# Patient Record
Sex: FEMALE | Race: WHITE
[De-identification: names, ages, dates, MRNs, and addresses within clinical notes are randomized per-mention and may not be internally consistent; named-entity substitution may affect disease eponyms.]

---

## 2017-10-16 PROBLEM — Z85.3 ENCOUNTER FOR FOLLOW-UP SURVEILLANCE OF BREAST CANCER: Status: ACTIVE | Noted: 2017-10-16

## 2017-10-16 PROBLEM — Z08 ENCOUNTER FOR FOLLOW-UP SURVEILLANCE OF BREAST CANCER: Status: ACTIVE | Noted: 2017-10-16

## 2018-04-17 ENCOUNTER — NURSE TRIAGE (OUTPATIENT)
Dept: OTHER | Facility: CLINIC | Age: 53
End: 2018-04-17

## 2019-06-21 ENCOUNTER — TELEPHONE (OUTPATIENT)
Dept: OTHER | Facility: CLINIC | Age: 54
End: 2019-06-21

## 2021-07-26 ENCOUNTER — NURSE TRIAGE (OUTPATIENT)
Dept: OTHER | Facility: CLINIC | Age: 56
End: 2021-07-26

## 2021-07-26 NOTE — TELEPHONE ENCOUNTER
Reason for Disposition   Puncture wound (hole through the skin) from cat (teeth or claws)    Answer Assessment - Initial Assessment Questions  1. ANIMAL: \"What type of animal caused the bite? \" \"Is the injury from a bite or a claw? \" If the animal is a dog or a cat, ask: \"Was it a pet or a stray? \" \"Was it acting ill or behaving strangely? \"      Domestic house cat    2. LOCATION: \"Where is the bite located? \"       R hand 5th finger and L wrist    3. SIZE: \"How big is the bite? \" \"What does it look like? \"       Puncture wounds at each location and many scratches    4. ONSET: \"When did the bite happen? \" (Minutes or hours ago)       7/26/21 at about 1000    5. CIRCUMSTANCES: \"Tell me how this happened. \"       Pt was taking the cat to the vet and cat was opposed to the idea    6. TETANUS: \"When was the last tetanus booster? \"      A long time ago at least 2013 ago    7. PREGNANCY: \"Is there any chance you are pregnant? \" \"When was your last menstrual period? \"      n/a    Protocols used: ANIMAL BITE-ADULT-OH    Brief description of triage: pt reports that her cat bit, clawed, and scratched her. There are puncture wounds on the R 5th finger and L wrist. They are swelling. Triage indicates for patient to go to [facility] now. Care advice provided, patient verbalizes understanding; denies any other questions or concerns; instructed to call back for any new or worsening symptoms. Attention Provider: Thank you for allowing me to participate in the care of your patient. The patient was connected to triage in response to symptoms provided. Please do not respond through this encounter as the response is not directed to a shared pool.

## 2024-04-16 ENCOUNTER — HOSPITAL ENCOUNTER (EMERGENCY)
Facility: HOSPITAL | Age: 59
Discharge: OTHER INSTITUTION | End: 2024-04-16
Attending: EMERGENCY MEDICINE
Payer: COMMERCIAL

## 2024-04-16 ENCOUNTER — HOSPITAL ENCOUNTER (EMERGENCY)
Facility: HOSPITAL | Age: 59
Discharge: HOME | End: 2024-04-16
Payer: COMMERCIAL

## 2024-04-16 ENCOUNTER — APPOINTMENT (OUTPATIENT)
Dept: RADIOLOGY | Facility: HOSPITAL | Age: 59
End: 2024-04-16
Payer: COMMERCIAL

## 2024-04-16 VITALS
TEMPERATURE: 97.6 F | HEIGHT: 62 IN | WEIGHT: 203 LBS | HEART RATE: 85 BPM | DIASTOLIC BLOOD PRESSURE: 69 MMHG | OXYGEN SATURATION: 98 % | RESPIRATION RATE: 18 BRPM | SYSTOLIC BLOOD PRESSURE: 139 MMHG | BODY MASS INDEX: 37.36 KG/M2

## 2024-04-16 VITALS
RESPIRATION RATE: 18 BRPM | HEIGHT: 66 IN | BODY MASS INDEX: 32.78 KG/M2 | SYSTOLIC BLOOD PRESSURE: 122 MMHG | OXYGEN SATURATION: 98 % | TEMPERATURE: 98.2 F | WEIGHT: 204 LBS | DIASTOLIC BLOOD PRESSURE: 79 MMHG | HEART RATE: 83 BPM

## 2024-04-16 DIAGNOSIS — H57.04 MYDRIASIS: Primary | ICD-10-CM

## 2024-04-16 DIAGNOSIS — H57.02 ANISOCORIA: Primary | ICD-10-CM

## 2024-04-16 LAB
ALBUMIN SERPL BCP-MCNC: 4.5 G/DL (ref 3.4–5)
ALP SERPL-CCNC: 49 U/L (ref 33–136)
ALT SERPL W P-5'-P-CCNC: 23 U/L (ref 7–45)
ANION GAP SERPL CALC-SCNC: 17 MMOL/L (ref 10–20)
AST SERPL W P-5'-P-CCNC: 29 U/L (ref 9–39)
BASOPHILS # BLD AUTO: 0.02 X10*3/UL (ref 0–0.1)
BASOPHILS NFR BLD AUTO: 0.5 %
BILIRUB SERPL-MCNC: 0.5 MG/DL (ref 0–1.2)
BUN SERPL-MCNC: 15 MG/DL (ref 6–23)
CALCIUM SERPL-MCNC: 9.5 MG/DL (ref 8.6–10.3)
CHLORIDE SERPL-SCNC: 103 MMOL/L (ref 98–107)
CO2 SERPL-SCNC: 25 MMOL/L (ref 21–32)
CREAT SERPL-MCNC: 0.92 MG/DL (ref 0.5–1.05)
CRP SERPL-MCNC: 0.46 MG/DL
EGFRCR SERPLBLD CKD-EPI 2021: 71 ML/MIN/1.73M*2
EOSINOPHIL # BLD AUTO: 0.09 X10*3/UL (ref 0–0.7)
EOSINOPHIL NFR BLD AUTO: 2.3 %
ERYTHROCYTE [DISTWIDTH] IN BLOOD BY AUTOMATED COUNT: 12 % (ref 11.5–14.5)
ERYTHROCYTE [SEDIMENTATION RATE] IN BLOOD BY WESTERGREN METHOD: 12 MM/H (ref 0–30)
GLUCOSE SERPL-MCNC: 110 MG/DL (ref 74–99)
HCT VFR BLD AUTO: 35.8 % (ref 36–46)
HGB BLD-MCNC: 12.2 G/DL (ref 12–16)
IMM GRANULOCYTES # BLD AUTO: 0 X10*3/UL (ref 0–0.7)
IMM GRANULOCYTES NFR BLD AUTO: 0 % (ref 0–0.9)
LYMPHOCYTES # BLD AUTO: 1.59 X10*3/UL (ref 1.2–4.8)
LYMPHOCYTES NFR BLD AUTO: 40.6 %
MAGNESIUM SERPL-MCNC: 2.1 MG/DL (ref 1.6–2.4)
MCH RBC QN AUTO: 30.1 PG (ref 26–34)
MCHC RBC AUTO-ENTMCNC: 34.1 G/DL (ref 32–36)
MCV RBC AUTO: 88 FL (ref 80–100)
MONOCYTES # BLD AUTO: 0.31 X10*3/UL (ref 0.1–1)
MONOCYTES NFR BLD AUTO: 7.9 %
NEUTROPHILS # BLD AUTO: 1.91 X10*3/UL (ref 1.2–7.7)
NEUTROPHILS NFR BLD AUTO: 48.7 %
NRBC BLD-RTO: 0 /100 WBCS (ref 0–0)
PLATELET # BLD AUTO: 252 X10*3/UL (ref 150–450)
POTASSIUM SERPL-SCNC: 3.5 MMOL/L (ref 3.5–5.3)
PROT SERPL-MCNC: 7.4 G/DL (ref 6.4–8.2)
RBC # BLD AUTO: 4.05 X10*6/UL (ref 4–5.2)
SODIUM SERPL-SCNC: 141 MMOL/L (ref 136–145)
WBC # BLD AUTO: 3.9 X10*3/UL (ref 4.4–11.3)

## 2024-04-16 PROCEDURE — 86140 C-REACTIVE PROTEIN: CPT | Performed by: PHYSICIAN ASSISTANT

## 2024-04-16 PROCEDURE — 70496 CT ANGIOGRAPHY HEAD: CPT | Performed by: RADIOLOGY

## 2024-04-16 PROCEDURE — 99283 EMERGENCY DEPT VISIT LOW MDM: CPT

## 2024-04-16 PROCEDURE — 36415 COLL VENOUS BLD VENIPUNCTURE: CPT | Performed by: EMERGENCY MEDICINE

## 2024-04-16 PROCEDURE — 71260 CT THORAX DX C+: CPT | Mod: FOREIGN READ | Performed by: RADIOLOGY

## 2024-04-16 PROCEDURE — 83735 ASSAY OF MAGNESIUM: CPT | Performed by: EMERGENCY MEDICINE

## 2024-04-16 PROCEDURE — 99284 EMERGENCY DEPT VISIT MOD MDM: CPT

## 2024-04-16 PROCEDURE — 2550000001 HC RX 255 CONTRASTS: Performed by: EMERGENCY MEDICINE

## 2024-04-16 PROCEDURE — 85652 RBC SED RATE AUTOMATED: CPT | Performed by: PHYSICIAN ASSISTANT

## 2024-04-16 PROCEDURE — 99222 1ST HOSP IP/OBS MODERATE 55: CPT | Performed by: PSYCHIATRY & NEUROLOGY

## 2024-04-16 PROCEDURE — 85025 COMPLETE CBC W/AUTO DIFF WBC: CPT | Performed by: EMERGENCY MEDICINE

## 2024-04-16 PROCEDURE — 70498 CT ANGIOGRAPHY NECK: CPT

## 2024-04-16 PROCEDURE — 70498 CT ANGIOGRAPHY NECK: CPT | Performed by: RADIOLOGY

## 2024-04-16 PROCEDURE — 99285 EMERGENCY DEPT VISIT HI MDM: CPT | Mod: 25

## 2024-04-16 PROCEDURE — 84075 ASSAY ALKALINE PHOSPHATASE: CPT | Performed by: EMERGENCY MEDICINE

## 2024-04-16 PROCEDURE — 71260 CT THORAX DX C+: CPT

## 2024-04-16 RX ORDER — TURMERIC ROOT EXTRACT 500 MG
1 TABLET ORAL DAILY
COMMUNITY

## 2024-04-16 RX ORDER — ALPRAZOLAM 0.25 MG/1
0.25 TABLET ORAL DAILY PRN
COMMUNITY

## 2024-04-16 RX ORDER — CHOLECALCIFEROL (VITAMIN D3) 50 MCG
50 TABLET ORAL DAILY
COMMUNITY

## 2024-04-16 RX ORDER — MULTIVITAMIN/IRON/FOLIC ACID 18MG-0.4MG
1 TABLET ORAL DAILY
COMMUNITY

## 2024-04-16 RX ORDER — BUTALB/ACETAMINOPHEN/CAFFEINE 50-325-40
1 TABLET ORAL DAILY
COMMUNITY

## 2024-04-16 RX ORDER — BISMUTH SUBSALICYLATE 262 MG
1 TABLET,CHEWABLE ORAL DAILY
COMMUNITY

## 2024-04-16 RX ORDER — MONTELUKAST SODIUM 10 MG/1
10 TABLET ORAL DAILY
COMMUNITY
End: 2024-04-16

## 2024-04-16 RX ORDER — GLUCOSAMINE/CHONDR SU A SOD 750-600 MG
1 TABLET ORAL DAILY
COMMUNITY

## 2024-04-16 RX ORDER — VITAMIN E MIXED 400 UNIT
1000 CAPSULE ORAL DAILY
COMMUNITY

## 2024-04-16 RX ORDER — TETRACYCLINE HCL 500 MG
1 CAPSULE ORAL DAILY
COMMUNITY

## 2024-04-16 RX ORDER — ZOLPIDEM TARTRATE 10 MG/1
10 TABLET ORAL NIGHTLY PRN
COMMUNITY

## 2024-04-16 RX ADMIN — IOHEXOL 90 ML: 350 INJECTION, SOLUTION INTRAVENOUS at 11:11

## 2024-04-16 ASSESSMENT — LIFESTYLE VARIABLES
HAVE PEOPLE ANNOYED YOU BY CRITICIZING YOUR DRINKING: NO
EVER HAD A DRINK FIRST THING IN THE MORNING TO STEADY YOUR NERVES TO GET RID OF A HANGOVER: NO
HAVE YOU EVER FELT YOU SHOULD CUT DOWN ON YOUR DRINKING: NO
TOTAL SCORE: 0
EVER FELT BAD OR GUILTY ABOUT YOUR DRINKING: NO

## 2024-04-16 ASSESSMENT — PAIN SCALES - GENERAL
PAINLEVEL_OUTOF10: 0 - NO PAIN

## 2024-04-16 ASSESSMENT — COLUMBIA-SUICIDE SEVERITY RATING SCALE - C-SSRS
1. IN THE PAST MONTH, HAVE YOU WISHED YOU WERE DEAD OR WISHED YOU COULD GO TO SLEEP AND NOT WAKE UP?: NO
6. HAVE YOU EVER DONE ANYTHING, STARTED TO DO ANYTHING, OR PREPARED TO DO ANYTHING TO END YOUR LIFE?: NO
2. HAVE YOU ACTUALLY HAD ANY THOUGHTS OF KILLING YOURSELF?: NO
6. HAVE YOU EVER DONE ANYTHING, STARTED TO DO ANYTHING, OR PREPARED TO DO ANYTHING TO END YOUR LIFE?: NO
2. HAVE YOU ACTUALLY HAD ANY THOUGHTS OF KILLING YOURSELF?: NO
1. IN THE PAST MONTH, HAVE YOU WISHED YOU WERE DEAD OR WISHED YOU COULD GO TO SLEEP AND NOT WAKE UP?: NO

## 2024-04-16 ASSESSMENT — PAIN - FUNCTIONAL ASSESSMENT
PAIN_FUNCTIONAL_ASSESSMENT: 0-10
PAIN_FUNCTIONAL_ASSESSMENT: 0-10

## 2024-04-16 NOTE — PROGRESS NOTES
Pharmacy Medication History Review    Precious Cruz is a 59 y.o. female admitted for No Principal Problem: There is no principal problem currently on the Problem List. Please update the Problem List and refresh.. Pharmacy reviewed the patient's [id number] medications and allergies for accuracy.    The list below reflectives the updated PTA list. Please review each medication in order reconciliation for additional clarification and justification.  Prior to Admission medications    Medication Sig Start Date End Date Taking? Authorizing Provider                                                                                                                        The list below reflectives the updated allergy list. Please review each documented allergy for additional clarification and justification.  Allergies  Reviewed by Lázaro Hammer PA-C on 4/16/2024        Severity Reactions Comments    Penicillins Not Specified Unknown             Below are additional concerns with the patient's PTA list.  Prior to Admission Medications   Prescriptions Last Dose Informant   ALPRAZolam (Xanax) 0.25 mg tablet     Sig: Take 1 tablet (0.25 mg) by mouth once daily as needed for anxiety.   NON FORMULARY     Sig: Take 1 each by mouth 2 times a day. gymnema-gisele   apple cider vinegar 500 mg tablet 4/15/2024    Sig: Take 1 tablet by mouth once daily.   b complex 0.4 mg tablet 4/15/2024    Sig: Take 1 tablet by mouth once daily.   berberine/herbal complex no.18 (BERBERINE-HERBAL COMB NO.18 ORAL) 4/15/2024    Sig: Take 1 capsule by mouth once daily.   biotin 2,500 mcg capsule 4/15/2024    Sig: Take 1 capsule (2.5 mg) by mouth once daily.   calcium citrate-vitamin D3 (Citracal+D) 315 mg-5 mcg (200 unit) tablet 4/15/2024    Sig: Take 1 tablet by mouth once daily.   cholecalciferol (Vitamin D3) 50 MCG (2000 UT) tablet 4/15/2024    Sig: Take 1 tablet (50 mcg) by mouth once daily.   glucosamine sul-chondroitn-msm  500-400-200 mg tablet 4/15/2024    Sig: Take 1 tablet by mouth once daily.   montelukast (Singulair) 10 mg tablet 4/15/2024    Sig: Take 1 tablet (10 mg) by mouth once daily.   multivitamin tablet 4/15/2024    Sig: Take 1 tablet by mouth once daily.   turmeric root extract 500 mg tablet 4/15/2024    Sig: Take 1 tablet by mouth once daily.   vitamin E 450 mg (1000 unit) capsule 4/15/2024    Sig: Take 1 capsule (1,000 Units) by mouth once daily.   zolpidem (Ambien) 10 mg tablet     Sig: Take 1 tablet (10 mg) by mouth as needed at bedtime for sleep.      Facility-Administered Medications: None      Per patient and  bedside.     Pina Nagel

## 2024-04-16 NOTE — CONSULTS
Reason for consult: acutely dilated right pupil    HPI:  60yo with PMH of HLD presents for dilated right pupil. States that she woke up this morning, took a shower, and upon exiting the shower, noticed that her right pupil was very dilated and that her vision was cloudy. This occurred at 8am. At that time, the right pupil was also not reacting to light. She presented to [facility], obtained CTA which was normal.     She denies other associated eye problems - no pain, foreign body sensation, flashes of light, floating spots, double vision, or sudden vision loss.    She has recently started Singulair tablets (montelukast) which she last used last night and has been using for the past 2 days. She is also currently using flonase. Otherwise, her only medications are occasional use of alprazolam for anxiety.     She reports her symptoms have been improving throughout the day.       Past Ocular History: refractive error, wears contacts, endorses good CL hygiene    Past Medical History: as above    Family History: reviewed and not pertinent to chief complaint    Medications:  Zolpidem  Alprazolam  Glucosamine sul-chondroitin-msm  Flonase  Afrin     Supplements:   Biotin  Vitamin E  Vitamin D3  Calcium  B-complex  Berberine  Sylvina  Apple cider vinegar  Turmeric extract      Allergies: please refer to patient allergy list    Base Eye Exam       Visual Acuity (Snellen - Linear)         Right Left    Near sc 20/20 20/20              Tonometry (Goldmann Applanation, 7:14 PM)         Right Left    Pressure 13 12              Pupils         Dark Light Shape React APD    Right 6 4 Round Brisk None    Left 4 2 Round Brisk None              Visual Fields         Left Right     Full Full              Extraocular Movement         Right Left     Full Full              Dilation       Both eyes: 1% Mydriacyl & 2.5% Abisai  @ 7:05 PM                  Additional Tests       Color         Right Left    Ishihara 11/11 11/11                  Slit  "Lamp and Fundus Exam       External Exam         Right Left    External Normal Normal              Slit Lamp Exam         Right Left    Lids/Lashes Normal, MRD1 of 4mm Normal, MRD1 of 4mm    Conjunctiva/Sclera White and quiet White and quiet    Cornea Clear Clear    Anterior Chamber Deep and quiet Deep and quiet    Iris Round and reactive, no vermiform movements Round and reactive, no vermiform movements    Lens tr NS/CC tr NS/CC    Anterior Vitreous PVD PVD              Fundus Exam         Right Left    Disc sharp margins sharp margins    C/D Ratio 0.4 0.35    Macula Normal Normal    Vessels Normal course and caliber Normal course and caliber    Periphery No tears, breaks, or holes No tears, breaks, or holes                   CTA 4/16/24:    \"The CT angiogram through the upper thorax demonstrates no significant  stenosis along the origins of right brachiocephalic artery, left  common carotid artery, or left subclavian artery. No significant  stenosis noted along the origins of the vertebral arteries.      The CT angiogram of the neck demonstrates no significant stenosis  along the common carotid arteries, carotid bifurcations, or cervical  segments of the internal carotid arteries. No significant stenosis  noted along the cervical segments of the vertebral arteries. There is  a right dominant vertebral artery.      The CT angiogram of the head demonstrates no significant stenosis  along the distal internal carotid arteries, distal vertebral  arteries, or basilar artery. No large vessel branch cutoffs of the  anterior cerebral arteries, middle cerebral, or posterior cerebral  arteries are noted. No intracranial aneurysm is noted.\"      A&P:    Precious Cruz is a 58yo female with past ocular hx of refractive error, contact lens use, presenting with R-sided mydriasis for the past 12 hours. She does endorse recent use of Singulair (montelukast) with the last dose being last night for persistent congestive symptoms, " as well as flonase, afrin (although she stopped using this 2 weeks ago). She also uses zolpidem and alprazolam occasionally for insomnia and anxiety, and she also uses numerous vitamins and supplements (see history above). Her right pupil was initially nonreactive to light, but she states this has been improving throughout the day. She endorses some blurry vision, but otherwise denies visual changes. Her entrance exam is notable for anisocoria which is equal in light and dark, and there is no afferent pupillary defect. Extraocular movements and color vision are full. The slit lamp exam is within normal limits, with no ptosis, and the DFE is normal with no optic disc edema noted. CTA is normal with no stenosis or intracranial aneurysm. 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 Montelukast has been reported to cause mydriasis with overdose, although the mechanism is unclear as it is not known to have anti-cholinergic properties. Regardless, given her symptomatic improvement over time, this is most likely due to an exposure, and there is less concern for third nerve palsy (no aneurysm or hemorrhage on CTA, no ptosis, no EOM deficits), Adie's tonic pupil (given equal anisocoria in light and dark and lack of vermiform movements of the iris), or benign episodic mydriasis (given no prior episodes).     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       Sumaya Olson MD  Ophthalmology, PGY2    Ophthalmology Adult Pager: [phone]  Ophthalmology Peds Pager: [phone]    For adult follow up appts, call [phone]  For pediatric follow up appts, call [phone]

## 2024-04-16 NOTE — PROGRESS NOTES
I received signout on Precious Cruz, at 1900 by Juancarlos Perry NP.  Please see previous note, HPI, physical exam and course of stay.  This is in addition to the primary record.      In brief,Precious Cruz is a FEMALE  presented to Emergency Department  for   Chief Complaint   Patient presents with    Blurred Vision      At time of signout we were awaiting, ophthalmology consult.  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  Patient verbalized understanding.  Patient educated neurology and ophthalmology will arrange for follow-up and to look out for their call.  Patient educated on symptoms to return back to the emergency department.        Floresita Jolley, KANDI-CNP  [facility]   Emergency Department

## 2024-04-16 NOTE — ED PROVIDER NOTES
HPI   Chief Complaint   Patient presents with    Blurred Vision       59-year-old female with history of HLD presents for evaluation transfer for ophthalmology consult.  Initially presented to [facility] emergency department with new onset blurry vision with enlarged pupil in the right eye.  Started this morning.  Noticed that after getting out of the shower.  Noticed that her eye was cloudy on the right side.  Rubbed her eye but still did not improve.  Denies any injury.  Denies any recent illness.  Denies any pain.  Workup was performed at [facility] including with multiple images, labs, and neurology consult.  They recommended transfer to [facility] for ophthalmology consult.  On arrival she endorses overall feeling more improved.  Denies any pain or headache.  Endorses that her vision seems to be improved and that she had reassuring visual acuity check at [facility].  Was wearing her glasses and does wear contacts but took them out last night.  Denies chest pain or dyspnea.  Denies nausea or vomiting.                          [place] Coma Scale Score: 15                     Patient History   Past Medical History:   Diagnosis Date    Anxiety disorder, unspecified     Anxiety    Encounter for full-term uncomplicated delivery ([facility])      (spontaneous vaginal delivery)    Personal history of malignant neoplasm of breast     History of malignant neoplasm of breast    Personal history of other infectious and parasitic diseases     History of shingles    Personal history of other specified conditions     History of palpitations    Varicella without complication     Varicella without complication     Past Surgical History:   Procedure Laterality Date    BREAST LUMPECTOMY  2016    Breast Surgery Lumpectomy    HYSTERECTOMY  2014    Hysterectomy    OTHER SURGICAL HISTORY  2017    Hand Repair    SENTINEL LYMPH NODE BIOPSY  2016    [place] Lymph Node Biopsy    SHOULDER SURGERY  2015    Shoulder Surgery      No family history on file.  Social History     Tobacco Use    Smoking status: Not on file    Smokeless tobacco: Not on file   Substance Use Topics    Alcohol use: Not on file    Drug use: Not on file       Physical Exam   ED Triage Vitals [04/16/24 1634]   Temperature Heart Rate Respirations BP   36.8 °C (98.2 °F) 83 18 122/79      Pulse Ox Temp Source Heart Rate Source Patient Position   98 % Temporal -- --      BP Location FiO2 (%)     -- 21 %       Physical Exam  Constitutional:       Appearance: Normal appearance.   HENT:      Head: Normocephalic and atraumatic.      Mouth/Throat:      Mouth: Mucous membranes are moist.      Pharynx: Oropharynx is clear.   Eyes:      Extraocular Movements: Extraocular movements intact.      Conjunctiva/sclera: Conjunctivae normal.      Pupils: Pupils are equal, round, and reactive to light.      Comments: Right eye dilated and minimally reactive to light, approximately 4 mm to 3 mm.  Left pupil round and reactive approximately 3 mm to 2 mm.  EOM intact.  No scleral erythema.  No discharge or drainage.  No swelling/ecchymosis.   Cardiovascular:      Rate and Rhythm: Normal rate and regular rhythm.      Pulses: Normal pulses.      Heart sounds: Normal heart sounds.   Pulmonary:      Effort: Pulmonary effort is normal.      Breath sounds: Normal breath sounds.   Abdominal:      General: Abdomen is flat.      Palpations: Abdomen is soft.   Musculoskeletal:         General: Normal range of motion.      Cervical back: Normal range of motion and neck supple.   Skin:     General: Skin is warm and dry.      Capillary Refill: Capillary refill takes less than 2 seconds.   Neurological:      General: No focal deficit present.      Mental Status: She is alert and oriented to person, place, and time.      GCS: GCS eye subscore is 4. GCS verbal subscore is 5. GCS motor subscore is 6.      Cranial Nerves: Cranial nerves 2-12 are intact.      Sensory: Sensation is intact.      Motor: Motor  function is intact.      Coordination: Coordination is intact.      Gait: Gait is intact.   Psychiatric:         Mood and Affect: Mood normal.         Behavior: Behavior normal.         Thought Content: Thought content normal.         Judgment: Judgment normal.         ED Course & MDM   Diagnoses as of 04/25/24 1415   Mydriasis       Medical Decision Making  Vital signs reviewed, unremarkable at this time.  Patient is well-appearing and in no apparent distress.  Speaks full sentences out difficulty.  Diagnostic testing performed at [facility] included Basic labs.  CBC with differential showed mild leukocytosis with WBC 3.9 but otherwise unremarkable.  CMP, magnesium within normal ranges and unremarkable.  CRP and ESR within normal ranges and unremarkable.  She also received CT angio head due to her symptoms, which showed no significant stenosis or acute findings.  CT chest with contrast was performed due to patient's new onset of symptoms with history of breast cancer to rule out tumor.  Showed no acute intrathoracic pathology but did show few scattered pulmonary nodules.  Ophthalmology was notified of patient's arrival and they are coming down to see her.  Neurology was also notified but they stated that at this time unless we have specific question that they did not need to see her and will defer to ophthalmology for now.  We can consider consulting officially later.  The patient was moved into a room and waited ophthalmology workup.  She remained calm and cooperative in stable condition for the duration of my shift.  Handed off to oncoming provider.        Procedure  Procedures     KANDI Zamora-BRENNAN  04/16/24 1834       KANDI Zamora-CNP  04/16/24 1904       KANDI Zamora-CNP  04/25/24 1416

## 2024-04-16 NOTE — Clinical Note
Precious Cruz was seen and treated in our emergency department on 4/16/2024.  She may return to work on 04/18/2024.       If you have any questions or concerns, please don't hesitate to call.      Floresita Jolley, APRN-CNP

## 2024-04-16 NOTE — ED TRIAGE NOTES
Pt. Complain of right side pupil dilated complain of right eye blurry vision. She noticed it this morning about 30 min ago. Denies headache , n/v, cp, sob, abd pain. Pt. States she started taking singulair last month and thinks it could be a side effect.

## 2024-04-16 NOTE — ED PROVIDER NOTES
HPI   Chief Complaint   Patient presents with    Blurred Vision    pupil dialated       59-year-old female presents with blurred vision right eye with dilated pupil.  She noted this 8 AM this morning after shower.  No trauma or injury to the eye or head.  No headache.  No other neurological symptoms.  She does not use any eyedrops.  She does wear contact lenses which she removed last night.  No other eyedrops in the house that could have intermittently been mixed up with her contact lens solutions.  No eye pain.  No diplopia.  No difficulty speech or swallowing.  No increased paresthesias.  No balance or gait disturbance.  No history of eye disease.      History provided by:  Patient   used: No                        No data recorded                   Patient History   Past Medical History:   Diagnosis Date    Anxiety disorder, unspecified     Anxiety    Encounter for full-term uncomplicated delivery ([facility])      (spontaneous vaginal delivery)    Personal history of malignant neoplasm of breast     History of malignant neoplasm of breast    Personal history of other infectious and parasitic diseases     History of shingles    Personal history of other specified conditions     History of palpitations    Varicella without complication     Varicella without complication     Past Surgical History:   Procedure Laterality Date    BREAST LUMPECTOMY  2016    Breast Surgery Lumpectomy    HYSTERECTOMY  2014    Hysterectomy    OTHER SURGICAL HISTORY  2017    Hand Repair    SENTINEL LYMPH NODE BIOPSY  2016    [place] Lymph Node Biopsy    SHOULDER SURGERY  2015    Shoulder Surgery     No family history on file.  Social History     Tobacco Use    Smoking status: Not on file    Smokeless tobacco: Not on file   Substance Use Topics    Alcohol use: Not on file    Drug use: Not on file       Physical Exam   ED Triage Vitals   Temperature Heart Rate Respirations BP   24  0844 04/16/24 0841 04/16/24 0841 04/16/24 0841   36.4 °C (97.6 °F) 80 20 (!) 147/105      Pulse Ox Temp Source Heart Rate Source Patient Position   04/16/24 0841 04/16/24 0844 -- --   98 % Temporal        BP Location FiO2 (%)     -- --             Physical Exam  Vitals and nursing note reviewed.   Constitutional:       General: She is not in acute distress.     Appearance: Normal appearance. She is not ill-appearing, toxic-appearing or diaphoretic.   HENT:      Head: Normocephalic and atraumatic.      Nose: Nose normal.   Eyes:      Extraocular Movements: Extraocular movements intact.      Conjunctiva/sclera: Conjunctivae normal.      Comments: Right pupil dilated 4 mm nonreactive to light.  Reports blurred vision.  Cornea clear.  Conjunctiva clear.  Ocular is intact without diplopia or pain.  No ptosis.  No proptosis.   Cardiovascular:      Rate and Rhythm: Normal rate and regular rhythm.   Pulmonary:      Effort: Pulmonary effort is normal.      Breath sounds: Normal breath sounds.   Abdominal:      Palpations: Abdomen is soft.      Tenderness: There is no abdominal tenderness.   Musculoskeletal:         General: Normal range of motion.      Cervical back: Normal range of motion and neck supple. No rigidity.   Skin:     General: Skin is warm and dry.   Neurological:      General: No focal deficit present.      Mental Status: She is alert and oriented to person, place, and time.      Sensory: No sensory deficit.      Motor: No weakness.      Coordination: Coordination normal.      Gait: Gait normal.      Comments: Cranial nerves intact except for pupil dilatation right eye   Psychiatric:         Mood and Affect: Mood normal.         Behavior: Behavior normal.         Thought Content: Thought content normal.         Judgment: Judgment normal.         ED Course & MDM   ED Course as of 04/16/24 1420   Tue Apr 16, 2024   1019 CBC with WBC 3.9.  Otherwise unremarkable.  Chemistries glucose 110 otherwise unremarkable.   ESR 12.  Magnesium 2.1. [GA]      ED Course User Index  [GA] Lázaro Hammer PA-C         Diagnoses as of 04/16/24 1420   Anisocoria     Labs Reviewed   CBC WITH AUTO DIFFERENTIAL - Abnormal       Result Value    WBC 3.9 (*)     nRBC 0.0      RBC 4.05      Hemoglobin 12.2      Hematocrit 35.8 (*)     MCV 88      MCH 30.1      MCHC 34.1      RDW 12.0      Platelets 252      Neutrophils % 48.7      Immature Granulocytes %, Automated 0.0      Lymphocytes % 40.6      Monocytes % 7.9      Eosinophils % 2.3      Basophils % 0.5      Neutrophils Absolute 1.91      Immature Granulocytes Absolute, Automated 0.00      Lymphocytes Absolute 1.59      Monocytes Absolute 0.31      Eosinophils Absolute 0.09      Basophils Absolute 0.02     COMPREHENSIVE METABOLIC PANEL - Abnormal    Glucose 110 (*)     Sodium 141      Potassium 3.5      Chloride 103      Bicarbonate 25      Anion Gap 17      Urea Nitrogen 15      Creatinine 0.92      eGFR 71      Calcium 9.5      Albumin 4.5      Alkaline Phosphatase 49      Total Protein 7.4      AST 29      Bilirubin, Total 0.5      ALT 23     MAGNESIUM - Normal    Magnesium 2.10     SEDIMENTATION RATE, AUTOMATED - Normal    Sedimentation Rate 12     C-REACTIVE PROTEIN      CT angio head and neck w and wo IV contrast   Final Result   The CT angiogram through the upper thorax demonstrates no significant   stenosis along the origins of right brachiocephalic artery, left   common carotid artery, or left subclavian artery. No significant   stenosis noted along the origins of the vertebral arteries.        The CT angiogram of the neck demonstrates no significant stenosis   along the common carotid arteries, carotid bifurcations, or cervical   segments of the internal carotid arteries. No significant stenosis   noted along the cervical segments of the vertebral arteries. There is   a right dominant vertebral artery.        The CT angiogram of the head demonstrates no significant stenosis   along  the distal internal carotid arteries, distal vertebral   arteries, or basilar artery. No large vessel branch cutoffs of the   anterior cerebral arteries, middle cerebral, or posterior cerebral   arteries are noted. No intracranial aneurysm is noted.        MACRO:   None        Signed by: Rolo Patricia 4/16/2024 12:25 PM   Dictation workstation:   [id number]      CT chest w IV contrast   Final Result   1. No acute intrathoracic process.   2. Few scattered small pulmonary nodules measuring up to 6 mm in the   right lower lobe. Non-contrast chest CT at 3-6 months is recommended.   If the nodules are stable at time of repeat CT, then future CT at   18-24 months (from today's scan) is considered optional for low-risk   patients, but is recommended for high-risk patients (Per Fleischner   Society guidelines).   3. Small hiatal hernia.   4. Hepatic steatosis.   Signed by Jesus Vaca MD      MR brain w and wo IV contrast    (Results Pending)      Medical Decision Making  Pupil dilatation right eye.  Rule out cranial nerve palsy, aneurysm, Karey syndrome  CTA head and neck labs ordered    The patient has also been seen and evaluated by the ER physician.  Neurological evaluation as above.  Recommends transfer to [facility] as below.    Neurology consultation: Dr. Bruce Villatoro is seen the patient in the ED.  Recommends transfer to [facility] for further ophthalmology and neurology evaluation.  Unclear etiology of anisocoria.    [facility] transfer center with   ophthalmology phone consultation Dr. Bridges at 1355.  Will evaluate the patient in [facility] ED  ED physician consultation Dr. Cruz at 1400 accepts for transfer to ED    Evaluation consistent with anisocoria of unclear etiology.  Transfer for further ophthalmology neurology evaluation as noted above.    Amount and/or Complexity of Data Reviewed  Labs: ordered.     Details: As above  Radiology: ordered.     Details: As above  Discussion of  management or test interpretation with external provider(s): Neurology consultation: Dr. Bruce Villatoro is seen the patient in the ED.  Recommends transfer to [facility] for further ophthalmology and neurology evaluation.  Unclear etiology of anisocoria.  [facility] transfer center with   ophthalmology phone consultation Dr. Bridges at 1355.  Will evaluate the patient in [facility] ED  ED physician consultation Dr. Cruz at 1400 accepts for transfer to ED      Risk  Decision regarding hospitalization.        Procedure  Procedures     Lázaro Hammer PA-C  04/16/24 9514

## 2024-04-16 NOTE — CONSULTS
Inpatient consult to Neurology  Consult performed by: Bruce Villatoro MD  Consult ordered by: Lázaro Hammer PA-C          History Of Present Illness  Precious Cruz is a 59 y.o. female presenting with new onset blurry vision associated with mydriasis OD.    She has past medical history significant for breast cancer status postlumpectomy, labyrinthitis and possible Ménière's disease, hysterectomy, anxiety, allergy/sinus symptoms.    She presented to the [facility] ED earlier today after noting new onset of monocular blurry vision OD and dilated pupil OD at roughly 8 AM today after a shower.    She denies use of eyedrops or scopolamine. She did take Zyrtec orally last night but does not believe she inadvertently got it in her eye.  She uses a hydrocortisone cream that could have inadvertently gotten into her eye.  There is no trauma history.  She denies headache or eye/periorbital pain.  Denies diplopia or ptosis.    She denies previous episodes of mydriasis.    She denies subjective weakness or numbness in any limb.    She denies recent constitutional symptoms.    I reviewed CT angiogram.  The head CT portion appears unremarkable.  CTA of the head shows no evidence of aneurysm and no occlusion or high-grade stenosis of intracranial vessels.  CT angiogram of the neck vessels is unremarkable.  She additionally underwent CTA of the upper thorax again with unremarkable findings.    Labs showed normal ESR of 12.  Pending CRP.      Past Medical History  Past Medical History:   Diagnosis Date    Anxiety disorder, unspecified     Anxiety    Encounter for full-term uncomplicated delivery ([facility])      (spontaneous vaginal delivery)    Personal history of malignant neoplasm of breast     History of malignant neoplasm of breast    Personal history of other infectious and parasitic diseases     History of shingles    Personal history of other specified conditions     History of palpitations    Varicella without  complication     Varicella without complication     Surgical History  Past Surgical History:   Procedure Laterality Date    BREAST LUMPECTOMY  09/02/2016    Breast Surgery Lumpectomy    HYSTERECTOMY  06/02/2014    Hysterectomy    OTHER SURGICAL HISTORY  11/16/2017    Hand Repair    SENTINEL LYMPH NODE BIOPSY  09/02/2016    [place] Lymph Node Biopsy    SHOULDER SURGERY  07/02/2015    Shoulder Surgery     Social History     Allergies  Penicillins  (Not in a hospital admission)      Review of Systems    Review of Systems:  Neurologic:  As per the history of present illness.  Constitutional:  Negative for fevers.  Musculoskeletal:  Negative for neck pain. Cardiovascular:  Negative for chest pain.  Respiratory:  Negative for dyspnea.  Eyes: As per the history of present illness.  ENT:  Negative for acute hearing loss.  GI:  Negative for vomiting.  :  Negative for bladder incontinence.  Dermatologic:  Negative for facial rash.      Neurological Exam  Physical Exam    Physical Examination:    General: Alert, sitting up in ED bed, no acute distress.    Mental Status: Clear sensorium without fluctuation.  Appropriate and oriented in conversation.  Recent and remote recall intact for details of medical history.  Attention and concentration intact during interview.  Fund of knowledge fair for medical information.  Language intact and fluent without paraphasic errors.    Cranial Nerves:  Funduscopic exam revealed a sharp temporal disc margin OD and was not as well-visualized OS on nondilated exam.  OS pupil was round and briskly reactive to light.  OD pupil was fixed at roughly 5-6 mm, similar in light and dark and without evident light reactivity.  Extraocular movements were intact and conjugate without nystagmus.  No ptosis.  Visual fields were full to confrontation tested monocularly.  Facial sensation was symmetric to pin.  Facial motor function was symmetrically intact.  Hearing was grossly intact.  No dysarthria.   "Shoulder shrug was symmetric.  Tongue protrusion was midline.    Motor: Muscle bulk and tone were normal throughout. There was no pronator drift or asymmetry of finger taps. Confrontation strength was symmetrically 5/5 throughout the upper and lower extremities.     Coordination: Finger to finger was accurate bilaterally without dysmetria or intention tremor.  No postural or rest tremor, myoclonus or dystonic posturing.    Tendon Reflexes: Symmetrically trace biceps and brachioradialis, 1+ patellar, neutral plantars.    Sensation: Pin and light touch were symmetric over the hands.  Romberg sign was absent.    Station: Intact and stable.    Gait: Stable and unremarkable evaluated over a short distance in ED room.        Last Recorded Vitals  Blood pressure 137/75, pulse 79, temperature 36.4 °C (97.6 °F), temperature source Temporal, resp. rate 18, height 1.575 m (5' 2\"), weight 92.1 kg (203 lb), SpO2 97%.    Relevant Results                                      I have personally reviewed the following imaging results CT chest w IV contrast    Result Date: 4/16/2024  STUDY: CT Chest with IV Contrast; 4/16/2024, 11:55 am INDICATION: Evaluate for tumor. New onset anisocoria. COMPARISON: CT cardiac 4/5/2021. ACCESSION NUMBER(S): [id number] ORDERING CLINICIAN: TAMIKO WINN TECHNIQUE:  CT of the chest was performed with intravenous contrast.  Omnipaque 350, 90 mL was administered intravenously. Automated mA/kV exposure control was utilized and patient examination was performed in strict accordance with principles of ALARA. Total DLP: 3196.4 FINDINGS: MEDIASTINUM: The heart is normal in size without pericardial effusion.  No coronary artery calcifications.  Central vascular structures opacify normally. LUNGS/PLEURA: There is no pleural effusion, pleural thickening, or pneumothorax. The airways are patent. Few scattered small pulmonary nodules measuring up to 6 mm in the right lower lobe on image 142 " series 701.  Lungs otherwise clear. LYMPH NODES: Thoracic lymph nodes are not enlarged. UPPER ABDOMEN: Small hiatal hernia.  There is fatty infiltration of the liver. Gallbladder is present without gallstones. BONES: There are no acute fractures.  No suspicious bony lesions.    1. No acute intrathoracic process. 2. Few scattered small pulmonary nodules measuring up to 6 mm in the right lower lobe. Non-contrast chest CT at 3-6 months is recommended. If the nodules are stable at time of repeat CT, then future CT at 18-24 months (from today's scan) is considered optional for low-risk patients, but is recommended for high-risk patients (Per Fleischner Society guidelines). 3. Small hiatal hernia. 4. Hepatic steatosis. Signed by Jesus Vaca MD    CT angio head and neck w and wo IV contrast    Result Date: 4/16/2024  Interpreted By:  Rolo Patricia, STUDY: CT ANGIO HEAD AND NECK W AND WO IV CONTRAST;  4/16/2024 11:54 am   INDICATION: Signs/Symptoms:new onset anisocoria.   COMPARISON: None.   ACCESSION NUMBER(S): [id number]   ORDERING CLINICIAN: TAMIKO WINN   TECHNIQUE: Thin cut axial CT images were generated over the upper thorax, neck, and head during the arterial passage of a full contrast bolus.  The bolus was generated with a power injector and followed with immediate saline flush. These image data were subtracted and then used for 3-D reconstructions. Maximum intensity projections and shaded surface displays were generated in multiple planes. In addition images were transferred into a  3-D processing program and additional projections and displays were reviewed  by the interpreting physician.   FINDINGS: The CT angiogram through the upper thorax demonstrates no significant stenosis along the origins of right brachiocephalic artery, left common carotid artery, or left subclavian artery. No significant stenosis noted along the origins of the vertebral arteries.   The CT angiogram of the neck  demonstrates no significant stenosis along the common carotid arteries, carotid bifurcations, or cervical segments of the internal carotid arteries. No significant stenosis noted along the cervical segments of the vertebral arteries. There is a right dominant vertebral artery.   The CT angiogram of the head demonstrates no significant stenosis along the distal internal carotid arteries, distal vertebral arteries, or basilar artery. No large vessel branch cutoffs of the anterior cerebral arteries, middle cerebral, or posterior cerebral arteries are noted. No intracranial aneurysm is noted.   The source CT images of the head demonstrate a nondilated ventricular system. There is no hyperdense acute intracranial hemorrhage or definite acute cortical infarction. No hyperdense acute intracranial hemorrhage is noted. There is no midline shift. There is a retention cyst or polyp noted within the inferior left maxillary sinus.   The source CT angiogram images of the neck demonstrate mild multilevel cervical spondylosis.       The CT angiogram through the upper thorax demonstrates no significant stenosis along the origins of right brachiocephalic artery, left common carotid artery, or left subclavian artery. No significant stenosis noted along the origins of the vertebral arteries.   The CT angiogram of the neck demonstrates no significant stenosis along the common carotid arteries, carotid bifurcations, or cervical segments of the internal carotid arteries. No significant stenosis noted along the cervical segments of the vertebral arteries. There is a right dominant vertebral artery.   The CT angiogram of the head demonstrates no significant stenosis along the distal internal carotid arteries, distal vertebral arteries, or basilar artery. No large vessel branch cutoffs of the anterior cerebral arteries, middle cerebral, or posterior cerebral arteries are noted. No intracranial aneurysm is noted.   MACRO: None   Signed by:  Rolo Patricia 4/16/2024 12:25 PM Dictation workstation:   [id number]       Assessment/Plan     Painless unilateral mydriasis OD first noted this morning in the absence of other features of cranial nerve III palsy.    CTA shows no evidence of aneurysm.    There is no obvious pharmacologic exposure.    Unilateral mydriasis can be a benign episodic phenomenon but she has no past history of such.    She does not endorse migrainous symptoms.    Although I think it is unlikely that this is a presentation of malignant cranial nerve involvement, given that she has a history of breast CA recommend contrasted brain MRI with upper cranial nerve protocol.  If this is unrevealing, and ophthalmology is not available in-house at [facility], then I would advise outpatient ophthalmology follow-up within the next few days for further evaluation.           Bruce Villatoro MD

## 2024-04-16 NOTE — DISCHARGE INSTRUCTIONS
Go directly to [facility] emergency department.  To be seen by ophthalmology in the ED.

## 2024-04-16 NOTE — ED TRIAGE NOTES
Pt transfer from [facility] for optho consult. She woke up with R sided blurry vision this morning with R pupil fixed and dilated. Denies pain. CT negative at [facility]

## 2024-04-16 NOTE — ED TRIAGE NOTES
59F coming from [facility] ED. R blurry vision since this AM, pupil dilated and fixed, not painful.  CT/CTA head/neck negative, evaluated by neurology at OSH, sent here for evaluation by ophthalmology.

## 2024-04-17 NOTE — DISCHARGE INSTRUCTIONS
Stop taking montelukast and assess for improvement of symptoms.    Neurology and ophthalmology will arrange for follow-up.  If new symptoms present or symptoms worsen return to the emergency department.

## 2024-07-05 ENCOUNTER — APPOINTMENT (OUTPATIENT)
Dept: OPHTHALMOLOGY | Facility: CLINIC | Age: 59
End: 2024-07-05
Payer: COMMERCIAL

## 2024-07-24 PROBLEM — Z85.3 HISTORY OF LEFT BREAST CANCER: Status: ACTIVE | Noted: 2024-07-24

## 2024-07-24 PROBLEM — Z98.890 STATUS POST LEFT BREAST LUMPECTOMY: Status: ACTIVE | Noted: 2024-07-24

## 2024-07-24 PROBLEM — Z92.3 HISTORY OF EXTERNAL BEAM RADIATION THERAPY: Status: ACTIVE | Noted: 2024-07-24

## 2024-07-24 PROBLEM — Z92.21 HISTORY OF AROMATASE INHIBITOR THERAPY: Status: ACTIVE | Noted: 2024-07-24

## 2024-07-24 NOTE — PROGRESS NOTES
Oncology Follow-Up    Precious Cruz  [id number]              Breast         AJCC Edition: 8th (AJCC), Diagnosis Date: August 18,2015, IA, cT1b cN0 cM0 G1      Treatment Synopsis:    Review of OSH records:  7/15/ 2015 - screening mammogram with left breast abnormality in upper lateral quadrant  7/29/15 left breast ultrasound - irregular shaped mass 6 x 7 x 7 mm  Core biopsy on 8/4/2015 was negative for malignancy  Excisional biopsy was pursued on 8/18/15 which showed left breast invasive ductal carcinoma, % positive, GA 5% positive, HER2 negative, grade 1, tumor size, 0.7cm, no DCIS, 2 mm margin.   Left lumpectomy and sentinel lymph node biopsy on 10/5/15, 0/1 lymph node, [id number], stage IA, Oncotype DX recurrence score of 20 (intermediate risk)      Completed radiation therapy 12/18/2015  Started Anastrozole 12/2015  Stage: [id number], IA     DEXA on 11/4/19 showed osteopenia with T score -1.2 in left femur neck     Anastrozole discontinued July 2022 after 7.5 years of treatment.      Subjective    Precious presents for her Oncology Follow Up Visit. She reports having a blown right pupil. Testing was negative, it was her Singulair. Once she stopped it went away. [place] rates her energy level 9/10 and reports no distress. She is doing monthly self breast exams. She is exercising with weights and yoga. China sees Dr. Palma in primary care. [place] denies any unusual headaches, balance issues, depression, cough, shortness of breath, problems swallowing, changes in chest/breast area, abdominal pain, bone or muscle pain, vaginal bleeding, rectal bleeding, blood in the urine, vaginal dryness, swelling arms or legs, new or unusual skin moles or lesions.       Objective      Vitals:    07/25/24 0918   BP: 137/71   Pulse: 69   SpO2: 96%        Constitutional: Well developed, alert/oriented x3, no distress, cooperative   Eyes: clear sclera   ENMT: mucous membranes moist, no apparent lesions   Head/Neck: Neck supple, no  bruits   Respiratory/Thorax: Patent airways, normal breath sounds with good chest expansion   Cardiovascular: Regular rate and rhythm, no murmurs, 2+ equal pulses of the extremities,   Gastrointestinal: Nondistended, soft, non-tender, no masses palpable, no organomegaly   Musculoskeletal: ROM intact, no joint swelling, normal strength   Extremities: normal extremities, no edema, cyanosis, contusions or wounds   Neurological: alert and oriented x3,  normal strength   Breast:     Lymphatic: No significant lymphadenopathy   Psychological: Appropriate mood and behavior   Skin: Warm and dry, no lesions, no rashes      Physical Exam  Chest:          Comments: Left upper/lateral and left axillary incisions without masses, nodules, skin changes, discharge. Right breast without masses, nodules, skin changes, discharge          Lab Results   Component Value Date    WBC 3.9 (L) 2024    HGB 12.2 2024    HCT 35.8 (L) 2024    MCV 88 2024     2024       Chemistry    Lab Results   Component Value Date/Time     2024 0907    K 3.5 2024 0907     2024 0907    CO2 25 2024 0907    BUN 15 2024 0907    CREATININE 0.92 2024 0907    Lab Results   Component Value Date/Time    CALCIUM 9.5 2024 0907    ALKPHOS 49 2024 0907    AST 29 2024 0907    ALT 23 2024 0907    BILITOT 0.5 2024 0907           Imagin/16/2023 10:52 2023 11:02     Study Result    Narrative & Impression   Name: REBA SCHULTZ    Patient ID: [id number] YOB: 1965 Height: 65.0 in.      Gender:     Female   Exam Date:  2023 Weight: 197.0 lbs.    Indications:      Fractures:        Treatments:          LEFT FEMUR - TOTAL   The bone mineral density : 0.920 g/cm2   T-score : -0.7    % of young normal mean :91 %   Z-score : 0.2    % of age matched mean : 103 %    % change vs. Previous : -     % change vs. Baseline : baseline     *Indicates significant change based on 95% confidence interval.      LEFT FEMUR - NECK   The bone mineral density : 0.850 g/cm2   T-score : -1.4    % of young normal mean: 82 %   Z-score : -0.1    % of age matched mean : 98 %    % change vs. Previous : -     % change vs. Baseline : baseline    *Indicates significant change based on 95% confidence interval.      SPINE L1-L4   The bone mineral density is 1.133 g/cm2   T-score : -0.4   % of young normal mean is 96 %   Z-score : 0.8    % of age matched mean is 109 %    % change vs. Previous : -     % change vs. Baseline : baseline    *Indicates significant change based on 95% confidence interval.       World Health Organization (WHO) criteria for post-menopausal,    Women:     Normal:       T-score at or above -1 SD          Osteopenia:   T-score between -1 and -2.5 SD     Osteoporosis: T-score at or below -2.5 SD           10-Year Fracture Risk:   Major Osteoporotic Fracture 7.3 %    Hip Fracture                0.5 %    Reported Risk Factors       None        Interpretation:   According to World Health Organization criteria, classification is   low bone mass (osteopenia)     Assessment/Plan    Precious is a 60 yo woman with a hx of T1bN0 left IDC diagnosed March 2016. She is s/p partial mastectomy, SLNB (0/1), XRT, and completed 7.5 years of anastrozole in July 2022. There is no evidence of recurrent disease on today's exam.     Plan:  Exam is negative, mammogram results pending.  Check when colonoscopy is due.  Consider shingles vaccines.  Encouraged monthly breast self exams, plant based diet, keep alcohol <3 drinks/week, exercise at least 2.5 hours/week.  We reviewed signs/symptoms of recurrence including new masses, new pigmented lesion, tugging or pulling of the skin, nipple discharge, rash in or around the chest area, or any new finding that doesn't resolve within a 2-3 weeks.  All of [place]'s questions/concerns were addressed.  Over 30 minutes of time  was spent with this patient with >50% of the time with education, counseling, and coordination of care.   I will see her back in one year. At that time, if her exam and mamm are negative, we will consider PRN visits.    Diagnoses and all orders for this visit:  History of left breast cancer  -     BI mammo bilateral screening tomosynthesis; Future  -     Clinic Appointment Request Follow Up; CLAY JAMES; Future  History of aromatase inhibitor therapy  Status post left breast lumpectomy  History of external beam radiation therapy      Clay James, KANDI-CNP

## 2024-07-25 ENCOUNTER — HOSPITAL ENCOUNTER (OUTPATIENT)
Dept: RADIOLOGY | Facility: CLINIC | Age: 59
Discharge: HOME | End: 2024-07-25
Payer: COMMERCIAL

## 2024-07-25 ENCOUNTER — OFFICE VISIT (OUTPATIENT)
Dept: HEMATOLOGY/ONCOLOGY | Facility: CLINIC | Age: 59
End: 2024-07-25
Payer: COMMERCIAL

## 2024-07-25 VITALS
SYSTOLIC BLOOD PRESSURE: 137 MMHG | HEART RATE: 69 BPM | OXYGEN SATURATION: 96 % | DIASTOLIC BLOOD PRESSURE: 71 MMHG | WEIGHT: 195 LBS | BODY MASS INDEX: 38.08 KG/M2

## 2024-07-25 VITALS — WEIGHT: 196 LBS | BODY MASS INDEX: 38.48 KG/M2 | HEIGHT: 60 IN

## 2024-07-25 DIAGNOSIS — Z98.890 STATUS POST LEFT BREAST LUMPECTOMY: ICD-10-CM

## 2024-07-25 DIAGNOSIS — Z92.3 HISTORY OF EXTERNAL BEAM RADIATION THERAPY: ICD-10-CM

## 2024-07-25 DIAGNOSIS — Z85.3 HISTORY OF LEFT BREAST CANCER: Primary | ICD-10-CM

## 2024-07-25 DIAGNOSIS — Z92.21 HISTORY OF AROMATASE INHIBITOR THERAPY: ICD-10-CM

## 2024-07-25 PROCEDURE — 77067 SCR MAMMO BI INCL CAD: CPT

## 2024-07-25 PROCEDURE — 99203 OFFICE O/P NEW LOW 30 MIN: CPT | Performed by: NURSE PRACTITIONER

## 2024-07-25 PROCEDURE — 77067 SCR MAMMO BI INCL CAD: CPT | Mod: BILATERAL PROCEDURE | Performed by: RADIOLOGY

## 2024-07-25 PROCEDURE — 99213 OFFICE O/P EST LOW 20 MIN: CPT | Performed by: NURSE PRACTITIONER

## 2024-07-25 PROCEDURE — 77063 BREAST TOMOSYNTHESIS BI: CPT | Mod: BILATERAL PROCEDURE | Performed by: RADIOLOGY

## 2024-07-25 ASSESSMENT — PAIN SCALES - GENERAL: PAINLEVEL: 0-NO PAIN

## 2024-07-25 NOTE — PATIENT INSTRUCTIONS
1. Exercise 2.5 hours per week; bone strengthening, cardio-vascular, resistance training.  2. Please do self breast exams monthly.  3. Keep alcohol under 3 drinks per week.  4. Sun safety - limit sun exposure from 11a-2p when its at its hottest, apply 15-30 sun block and re-apply every 1-2 hours if perspiring or swimming.  5. Eat a plant based diet, add in oily fishes such as mackerel, tuna, and salmon.  6. Get in at least 1,000 mg of calcium per day through diet or supplement for bone strength. Examples of foods higher in calcium are milk, yogurt, fruited yogurt, oranges, fortified orange juice, almonds, almond milk, broccoli, spinach, bok pao, mustard greens, puddings, custards, ice cream, fortified cereals, bars, and crackers.   7. Please see dermatology, check as to when your next colonoscopy is due. Consider having the shingles vaccine.  8. Please call the office if any new mass or rash in or around breast, or any uncontrolled symptoms that last over 2-3 weeks at [phone].   9. Your exam is negative. Your mammogram is pending. I will call you if any follow up is needed.  10. It was nice seeing you today, [place]. I will see you back in one year. At that time, we can consider going to visits as needed.  Thank you for choosing [facility] for your care.

## 2024-09-10 ENCOUNTER — APPOINTMENT (OUTPATIENT)
Dept: OPHTHALMOLOGY | Facility: CLINIC | Age: 59
End: 2024-09-10
Payer: COMMERCIAL

## 2024-09-10 DIAGNOSIS — H57.02 ANISOCORIA: Primary | ICD-10-CM

## 2024-09-10 PROCEDURE — 99214 OFFICE O/P EST MOD 30 MIN: CPT | Performed by: PSYCHIATRY & NEUROLOGY

## 2024-09-10 ASSESSMENT — CONF VISUAL FIELD
METHOD: COUNTING FINGERS
OS_NORMAL: 1
OS_INFERIOR_NASAL_RESTRICTION: 0
OS_INFERIOR_TEMPORAL_RESTRICTION: 0
OD_SUPERIOR_TEMPORAL_RESTRICTION: 0
OD_SUPERIOR_NASAL_RESTRICTION: 0
OS_SUPERIOR_NASAL_RESTRICTION: 0
OD_INFERIOR_NASAL_RESTRICTION: 0
OD_NORMAL: 1
OD_INFERIOR_TEMPORAL_RESTRICTION: 0
OS_SUPERIOR_TEMPORAL_RESTRICTION: 0

## 2024-09-10 ASSESSMENT — ENCOUNTER SYMPTOMS
CONSTITUTIONAL NEGATIVE: 0
MUSCULOSKELETAL NEGATIVE: 0
GASTROINTESTINAL NEGATIVE: 0
HEMATOLOGIC/LYMPHATIC NEGATIVE: 0
ENDOCRINE NEGATIVE: 0
ALLERGIC/IMMUNOLOGIC NEGATIVE: 0
CARDIOVASCULAR NEGATIVE: 0
PSYCHIATRIC NEGATIVE: 0
NEUROLOGICAL NEGATIVE: 0
RESPIRATORY NEGATIVE: 0
EYES NEGATIVE: 1

## 2024-09-10 ASSESSMENT — VISUAL ACUITY
OD_CC: 20/20
OS_CC+: -1
METHOD: SNELLEN - LINEAR
CORRECTION_TYPE: CONTACTS
OS_CC: 20/20

## 2024-09-10 ASSESSMENT — CUP TO DISC RATIO
OS_RATIO: 0.35
OD_RATIO: 0.4

## 2024-09-10 ASSESSMENT — EXTERNAL EXAM - LEFT EYE: OS_EXAM: NORMAL

## 2024-09-10 ASSESSMENT — EXTERNAL EXAM - RIGHT EYE: OD_EXAM: NORMAL

## 2024-09-10 ASSESSMENT — TONOMETRY
OS_IOP_MMHG: 12
OD_IOP_MMHG: 13
IOP_METHOD: GOLDMANN APPLANATION

## 2024-09-10 NOTE — PROGRESS NOTES
Assessment and Plan    04/16/2024 CTA head & neck, which I personally reviewed, shows no lesion.    This 59 year-old woman with a history of left breast cancer, HLD, obesity presents for evaluation of anisocoria.    Today eye examination is normal. I suspect she had benign episodic pupillary dilation (BEPD) given lack of other findings. A pharmacologic cause is possible, too, although the mechanism of montelukast is not one to cause this problem. Usually, the anisocoria is produced by instillation more into one eye than the other, not from a systemic medication.    We discussed return precautions including drooping of the eyelid and misalignment of the eyes.    Plan    Follow up with neuro-ophthalmology as needed.

## 2024-10-03 ENCOUNTER — APPOINTMENT (OUTPATIENT)
Dept: OTOLARYNGOLOGY | Facility: CLINIC | Age: 59
End: 2024-10-03
Payer: COMMERCIAL

## 2024-10-03 VITALS — WEIGHT: 195 LBS | BODY MASS INDEX: 38.08 KG/M2

## 2024-10-03 DIAGNOSIS — J30.9 ALLERGIC RHINITIS, UNSPECIFIED SEASONALITY, UNSPECIFIED TRIGGER: Primary | ICD-10-CM

## 2024-10-03 DIAGNOSIS — H93.19 TINNITUS, UNSPECIFIED LATERALITY: ICD-10-CM

## 2024-10-03 DIAGNOSIS — H69.90 DYSFUNCTION OF EUSTACHIAN TUBE, UNSPECIFIED LATERALITY: ICD-10-CM

## 2024-10-03 DIAGNOSIS — K21.9 CHRONIC GERD: ICD-10-CM

## 2024-10-03 PROCEDURE — 99204 OFFICE O/P NEW MOD 45 MIN: CPT | Performed by: GENERAL PRACTICE

## 2024-10-03 RX ORDER — FLUTICASONE PROPIONATE 50 MCG
2 SPRAY, SUSPENSION (ML) NASAL DAILY
Qty: 16 G | Refills: 3 | Status: SHIPPED | OUTPATIENT
Start: 2024-10-03 | End: 2025-10-03

## 2024-10-03 RX ORDER — GUAIFENESIN, PSEUDOEPHEDRINE HYDROCHLORIDE 600; 60 MG/1; MG/1
1 TABLET, EXTENDED RELEASE ORAL EVERY 12 HOURS
Qty: 60 TABLET | Refills: 2 | Status: SHIPPED | OUTPATIENT
Start: 2024-10-03 | End: 2025-10-03

## 2024-10-03 RX ORDER — OMEPRAZOLE 20 MG/1
20 CAPSULE, DELAYED RELEASE ORAL
Qty: 30 CAPSULE | Refills: 3 | Status: SHIPPED | OUTPATIENT
Start: 2024-10-03 | End: 2025-10-03

## 2024-10-04 ENCOUNTER — CLINICAL SUPPORT (OUTPATIENT)
Dept: AUDIOLOGY | Facility: CLINIC | Age: 59
End: 2024-10-04
Payer: COMMERCIAL

## 2024-10-04 DIAGNOSIS — H93.12 TINNITUS, LEFT EAR: ICD-10-CM

## 2024-10-04 DIAGNOSIS — R42 DIZZINESS: ICD-10-CM

## 2024-10-04 DIAGNOSIS — H90.6 MIXED HEARING LOSS, BILATERAL: Primary | ICD-10-CM

## 2024-10-04 PROCEDURE — 92557 COMPREHENSIVE HEARING TEST: CPT

## 2024-10-04 PROCEDURE — 92567 TYMPANOMETRY: CPT

## 2024-10-04 NOTE — PROGRESS NOTES
AUDIOLOGIC EVALUATION      Name:  Precious Cruz  :  1965  Age:  59 y.o.  Date of Evaluation:  10/4/2024    Time: 9414-8420    HISTORY:  Precious Cruz, 59 y.o., was seen for an audiologic assessment at the request of Dr. David Agrawal. She reported a gradual decline in hearing sensitivity that began about 15 years ago. She reported left aural fullness that began 3 months ago. She explained that it feels like she has water in her ear. She constant tinnitus that has become more severe. She described it as holding a shell up to her ear. She has dizziness that has persisted for 3 months and is provoked by changes in barometric pressure and leaning back. She noted it lasts seconds in duration. She feels that she can hear her own voice in her head, specifically in the left ear. She denied otalgia, otorrhea, history of noise exposure, history of otologic surgeries, and recent falls.       RESULTS:  Otoscopic Evaluation:  Right Ear: Unremarkable  Left Ear: Unremarkable    Immittance Measures:  Right Ear: Type A -- indicating normal volume, pressure, and static compliance  Left Ear: Type A -- indicating normal volume, pressure, and static compliance    Acoustic Reflexes:  Right Ear: (Ipsilateral) Responses absent at 500-4000 Hz.  Left Ear: (Ipsilateral) Responses absent at 500-4000 Hz.    Pure Tone Audiometry:    Right Ear: Mild mixed hearing loss 125-2000 Hz rising to hearing within normal limits 9035-4743 Hz. Note conductive component at 1000 Hz.    Left Ear: Moderate to mild mixed hearing loss 125-2000 Hz rising to hearing within normal limits 5535-1436 Hz. Note conductive component at 1000 and 4000 Hz.     Pure tones were assessed with inserts and checked with headphones.     Asymmetry: No    Reliability:   Good    Speech Audiometry:   Right: Speech Reception Threshold (SRT) was obtained at 35 dBHL.   SRT/PTA in Good agreement with pure tone average.    Left: Speech Reception Threshold (SRT) was obtained  at 35 dBHL.   SRT/PTA in Good agreement with pure tone average.    Word Recognition Scores (WRS):  Right Ear: excellent (100%) in quiet when words were presented at 80 dBHL w/ masking.   Left Ear: excellent (100%) in quiet when words were presented at 80 dBHL w/ masking.     Asymmetry: No      IMPRESSIONS:  Today's testing revealed normal ear canal volume, middle ear pressure, and tympanic membrane compliance in both ears. She has a mild rising to normal mixed hearing loss in the right ear and a moderate to mild rising to normal mixed hearing loss in the left ear. She has excellent word recognition in both ears. She is a bilateral hearing aid candidate pending medical clearance. The results were discussed with the patient. She should follow-up with Dr. Roland for further evaluation of her mixed hearing loss. She should check her insurance and verify hearing aid benefits. If interested, she can schedule a hearing aid consultation with an in-network provider.       RECOMMENDATIONS:  1.) Continue medical follow up with primary care provider and/or Ears Nose and Throat (ENT) provider as recommended.  2.) Return for audiologic evaluation in conjunction with medical management or annually (whichever is sooner) to monitor hearing sensitivity and assess middle ear status or sooner should concerns arise. The audiology department can be reached at [phone] to schedule an appointment.   3.) Consider amplification pending medical clearance. Check insurance for hearing aid benefits and in-network providers. To schedule a hearing aid consultation with [facility] audiology department, call [phone]. Patient was provided with insurance verification worksheet.      Carlo Barron, CCC-A  Clinical Audiologist        KEY  SNHL Sensorineural Hearing Loss   CHL Conductive Hearing Loss   MHL Mixed Hearing Loss   SSNHL Sudden Sensorineural Hearing Loss   WNL Within Normal Limits   PTA Pure Tone Average   TM  Tympanic Membrane   ECV Ear Canal Volume   SRT Speech Reception Threshold   WRS Word Recognition Score

## 2024-10-06 NOTE — PROGRESS NOTES
Otolaryngology - Head and Neck Surgery Outpatient New Patient Visit Note        Assessment/Plan:   Problem List Items Addressed This Visit    None  Visit Diagnoses         Codes    Allergic rhinitis, unspecified seasonality, unspecified trigger    -  Primary J30.9    Dysfunction of Eustachian tube, unspecified laterality     H69.90    Relevant Medications    fluticasone (Flonase) 50 mcg/actuation nasal spray    pseudoephedrine-guaifenesin (Mucinex D)  mg 12 hr tablet    Other Relevant Orders    Referral to Audiology    Tinnitus, unspecified laterality     H93.19    Relevant Medications    fluticasone (Flonase) 50 mcg/actuation nasal spray    pseudoephedrine-guaifenesin (Mucinex D)  mg 12 hr tablet    Other Relevant Orders    Referral to Audiology    Chronic GERD     K21.9    Relevant Medications    omeprazole (PriLOSEC) 20 mg DR capsule            59yoF with L ear fullness, some nonpulsatile fluid sounds and autophony, in the setting of allergic rhinitis and GERD.     No evidence of otitis on exam.   Will acquire audiogram to aid in eval  Will treat for rhinitis and reflux to aid in ETD management.     Discussed conservative management of reflux, and risks of long term anti-reflux medications.  Discussed avoidance of triggers, dietary and behavioral management strategies for reflux.  Discussed possible referral to GI for further testing and evaluation.  Will trial maximal medical therapy (combination PPI and H2 blockers) for 1-2 months and assess for symptom response.  Plan for taper of medical management to least possible to control symptoms, in favor of using dietary/behavioral controls.               Follow up:  -plan for follow up in clinic as needed, after completion of ordered studies, and in 1-2 months    All of the above findings, impressions, treatment planning and follow up plans were discussed with the patient who indicated understanding.  the patient was instructed to contact or return to  clinic sooner if symptoms/signs persist or worsen despite the above management.      David Agrawal MD  Otolaryngology - Head and Neck Surgery            History Of Present Illness  Precious Cruz is a 59 y.o. female presenting for concerns of L>R ear fullness, intermittent autophony, nonpulsatile whooshing fluid sound.   Some association with positional changes.     Notes some baseline tinnitus and hearing loss over years.   More recent symptoms notable over months.  No inciting illness/trauma.   No significant recurrent otitis or ear trauma/surgery.  No significant history of ETD with travel.    Reports no otalgia, otorrhea.     Reports a hsitory of allergic rhinitis, using claritin, flonase, PRN.  Sinusitis 2-3x per year.    Reports a history of GERD, takes pepcid in PM with some breakthroughsymptoms intermittently.             Past Medical History  She has a past medical history of Anxiety disorder, unspecified, Encounter for full-term uncomplicated delivery, Personal history of malignant neoplasm of breast, Personal history of other infectious and parasitic diseases, Personal history of other specified conditions, and Varicella without complication.    Surgical History  She has a past surgical history that includes Hysterectomy (06/02/2014); Other surgical history (11/16/2017); [place] lymph node biopsy (09/02/2016); Breast lumpectomy (Left, 09/02/2016); and Shoulder surgery (07/02/2015).     Social History  She reports that she has never smoked. She has never used smokeless tobacco. No history on file for alcohol use and drug use.    Family History  No family history on file.     Allergies  Penicillins    Review of Systems  ROS: Pertinent positives as noted in HPI.    - CONSTITUTIONAL: Does not report weight loss, fever or chills.    - HEENT:   Ear: Does not report  , vertigo,    , otalgia, otorrhea  Nose: Does not report  ,  , epistaxis, decreased smell  Throat: Does not report pain, dysphagia,  odynophagia  Larynx: Does not report hoarseness,  difficulty breathing, pain with speaking (odynophonia)  Neck: Does not report new masses, pain, swelling  Face: Does not report sinus pain, pressure, swelling, numbness, weakness     - RESPIRATORY: Does not report SOB or cough.    - CV: Does not report palpitations or chest pain.     - GI: Does not report abdominal pain, nausea, vomiting or diarrhea.    - : Does not report dysuria or urinary frequency.    - MSK: Does not report myalgia or joint pain.    - SKIN: Does not report rash or pruritus.    - NEUROLOGICAL: Does not report headache or syncope.    - PSYCHIATRIC: Does not report recent changes in mood. Does not report anxiety or depression.         Physical Exam:     GENERAL:   Alert & Oriented to person, place and time; Normal affect and appearance. Well developed and well nourished. Conversant & cooperative with examination.     HEAD:   Normocephalic, atraumatic. No sinus tenderness to palpation. Normal parotid bilaterally. Normal facial strength.     NEUROLOGIC:   Cranial nerves II-XII grossly intact, gait WNL. Normal mood and affect.    EYES:   Extraocular movements intact. Pupils equal, round, reactive to light and accommodation. No nystagmus, no ptosis. no scleral injection.    EAR:   Normal auricle. No discomfort or TTP with manipulation.   Handheld otoscopic exam showed normal external auditory canals bilaterally. No purulence or EAC inflammation. Minimal cerumen.   Right tympanic membrane clear and mobile without evidence of perforation, retraction or middle ear effusion.   Left tympanic membrane clear and mobile without evidence of perforation, retraction or middle ear effusion.     NOSE:   No external deformity. No external nasal lesions, lacerations, or scars. Nasal tip symmetrical with normal nasal valves.   Nasal cavity with essentially midline septum, edematous  mucosa and turbinates. No lesions, masses, purulence or polyps.     OC/OP:   Mucous  membranes moist, no masses, lesions or exudates.   Normal tongue, floor of mouth, teeth, gums, lips. Normal posterior pharyngeal wall.    Normal tonsils without erythema, exudate or obvious calculi     NECK:   No neck masses or thyroid enlargement. Trachea midline. No tenderness to palpation    LYMPHATIC:   No cervical lymphadenopathy.     RESPIRATORY:   Symmetric chest elevation & no retractions. No significant hoarseness. No increased work of breathing.    CV:   No clubbing or cyanosis. No obvious edema    Skin:   No facial rashes, vesicles or lesions.     Extremities:   No gross abnormalities      Clinic Procedure        Information review:  External sources (notes, imaging, lab results) listed below personally reviewed to aid in medical decision making process.  -  -  -

## 2024-11-11 ENCOUNTER — CLINICAL SUPPORT (OUTPATIENT)
Dept: AUDIOLOGY | Facility: CLINIC | Age: 59
End: 2024-11-11

## 2024-11-11 DIAGNOSIS — H90.6 MIXED HEARING LOSS, BILATERAL: Primary | ICD-10-CM

## 2024-11-11 PROCEDURE — 92700 UNLISTED ORL SERVICE/PX: CPT | Mod: AUDSP

## 2024-11-11 ASSESSMENT — PAIN SCALES - GENERAL: PAINLEVEL_OUTOF10: 0 - NO PAIN

## 2024-11-11 ASSESSMENT — PAIN - FUNCTIONAL ASSESSMENT: PAIN_FUNCTIONAL_ASSESSMENT: 0-10

## 2024-11-11 NOTE — PROGRESS NOTES
AUDIOLOGY HEARING AID EVALUATION      Name:  Precious Cruz  :  1965  Age:  59 y.o.  Date of Encounter:  2024     Time: 9743-8671    HISTORY:  Ms. Cruz was seen today for a hearing aid consultation. A previous hearing evaluation on 10/4/24 indicated mild mixed hearing loss 125-2000 Hz rising to hearing within normal limits 2224-4735 Hz, note conductive component at 1000 Hz in the right ear. In the left ear she has moderate to mild mixed hearing loss 125-2000 Hz rising to hearing within normal limits 1930-2267 Hz. Note conductive component at 1000 and 4000 Hz. Precious indicated she would be interested in hearing aids.       IMPRESSIONS:  The hearing test from 10/4/24 was reviewed with the patient. She is a hearing aid candidate in both ears. The benefits and drawbacks of different hearing aid styles and levels of technology were reviewed. Pricing and policies were reviewed. Basic hearing aid use and parts were discussed. She would like to complete a flex trial with the devices. She was fit with [employer]Storm L90-R devices with a 2S  and small open domes. She signed the loaner agreement. She will be billed for the appointment, no one was available at check-out.     She has a hearing aid benefit through her insurance. They cover 100% of the hearing aids up to $2500 for the set. She does not need prior authorization or to meet her deductible. This is an in and out of network benefit. The hearing aids would be billed to insurance.       RECOMMENDATIONS:  1.) Strive for full-time device use during waking hours, except when activities preclude device safety.  2.) Return for your flex trial follow-up.  3.) Contact your audiologist if you have any issues prior to your next appointment to address the issue in a timely manner. Call [phone] and request a call back from Carlo Barron, JACINDA-A.      CARLO Rincon CCC-A   Clinical Audiologist     Immediate Brief Procedure Note    Patient Name: Azam Hines  YOB: 1948  DATE OF PROCEDURE: 8/4/2021  PROCEDURALIST: Gautam Vasques MD, Corky Fuentes MD  ASSISTANT(S): None  ANESTHESIA TYPE: Minimal Sedation with fentanyl only  ANESTHESIOLOGIST: No anesthesia staff entered.    PROCEDURE PERFORMED:bilateral pelvic collection sinogram and drain exchange.    Pre-procedure Dx:   Patient Active Problem List   Diagnosis   • Esophageal reflux   • Mixed hyperlipidemia   • Essential hypertension, benign   • Allergic rhinitis due to other allergen   • Impaired fasting glucose   • Microscopic hematuria   • Sleep apnea, obstructive   • Hyperuricemia   • Sprain of lumbar region   • Overweight   • Insomnia, unspecified   • Actinic keratoses   • Acne rosacea   • Primary osteoarthritis of right hip   • Prostate cancer (CMS/HCC)       Post-procedure Dx: Same    Findings: bilateral pelvic collection sinogram and drain exchange. Upsized to 12fr drains bilaterally. As the collections demonstrate some internal complexity on CT, consider lysis of the collections.    Estimated Blood Loss: Less than 5 ml    Complications: None    Specimens Removed: Not sent to lab

## 2024-12-06 ENCOUNTER — CLINICAL SUPPORT (OUTPATIENT)
Dept: AUDIOLOGY | Facility: CLINIC | Age: 59
End: 2024-12-06

## 2024-12-06 DIAGNOSIS — H90.6 MIXED HEARING LOSS, BILATERAL: Primary | ICD-10-CM

## 2024-12-06 ASSESSMENT — PAIN SCALES - GENERAL: PAINLEVEL_OUTOF10: 0 - NO PAIN

## 2024-12-06 ASSESSMENT — PAIN - FUNCTIONAL ASSESSMENT: PAIN_FUNCTIONAL_ASSESSMENT: 0-10

## 2024-12-06 NOTE — PROGRESS NOTES
ADULT FLEX TRIAL FOLLOW-UP      Name:  Precious Cruz   :  1965   Age:  59 y.o.   Date of Evaluation:  2024     Time: 930-1000      HISTORY:  Precious Cruz is in for a flex trial follow-up. Recall, a previous hearing evaluation on 10/4/24 indicated mild mixed hearing loss 125-2000 Hz rising to hearing within normal limits 4893-3225 Hz, note conductive component at 1000 Hz in the right ear. In the left ear she has moderate to mild mixed hearing loss 125-2000 Hz rising to hearing within normal limits 5697-4738 Hz. Note conductive component at 1000 and 4000 Hz.     She completed a flex trial with Audeo L90-R devices with a 2S  and small open domes.     She felt she did well overall. She noted most improvement at home and in larger groups. She would like to order her own devices.       IMPRESSIONS:    Hearing Aid Information Right Left    Phonak Phonak   Model Audeo I70-R Audeo I70-R    2S 2S   Dome Small open Small open     The hearing aids will be ordered from the  and billing will be submitted to insurance. They cover 100% of the hearing aids up to $2500 for the set. She does not need prior authorization or to meet her deductible. This is an in and out of network benefit. The hearing aids would be billed to insurance. A hearing aid fitting is scheduled for 24.      RECOMMENDATIONS:  Return for scheduled hearing aid fitting on 24 with Carlo Barron CCC-A. She is scheduled for 30 minutes as she needs to get in before work.   Return for audiologic assessment in conjunction with otologic care or annually, whichever is sooner. Return sooner if concerns arise.   Contact your audiologist if you have any issues prior to your next appointment to address the issue in a timely manner.       Carlo Barron CCC-A  Clinical Audiologist

## 2024-12-18 ENCOUNTER — TELEMEDICINE (OUTPATIENT)
Dept: PRIMARY CARE | Facility: CLINIC | Age: 59
End: 2024-12-18
Payer: COMMERCIAL

## 2024-12-18 DIAGNOSIS — J01.01 ACUTE RECURRENT MAXILLARY SINUSITIS: Primary | ICD-10-CM

## 2024-12-18 PROCEDURE — 99213 OFFICE O/P EST LOW 20 MIN: CPT

## 2024-12-18 RX ORDER — AZITHROMYCIN 250 MG/1
TABLET, FILM COATED ORAL
Qty: 6 TABLET | Refills: 0 | Status: SHIPPED | OUTPATIENT
Start: 2024-12-18 | End: 2024-12-23

## 2024-12-18 ASSESSMENT — ENCOUNTER SYMPTOMS
SINUS PRESSURE: 1
COUGH: 0
SORE THROAT: 0
NECK PAIN: 0
HEADACHES: 1
DIAPHORESIS: 0

## 2024-12-18 ASSESSMENT — LIFESTYLE VARIABLES: HISTORY_OF_SMOKING: I HAVE NEVER SMOKED

## 2024-12-18 NOTE — PATIENT INSTRUCTIONS
Drinking plenty of fluids and getting lots of rest. Chicken soup and hot beverages may help.    Trial of nasal irrigation with a Nettipot or squeeze bottle with sterile salt water.    Nasal spray corticosteroids (Flonase) may help in reducing the inflammatory response in the nasal passages and airways. Please try 2 sprays each nostril daily for 2 weeks.  If you have season allergies, please take a daily antihistamine of your choice, such as Zyrtec/Claritin/Allegra at bedtime.    Take Tylenol or Motrin/Aleve for sinus or ear pain.    If you have good blood pressure you can try pseudofed (you must ask the pharmacist for it and show ID).    Please call or return to the office if you are not feeling better in the next 3-4 days after starting treatment.    Over-the-counter cold and cough medications     Take Mucinex for cough, drink plenty of fluids with this medication and will help break up congestion making it easier to cough up     For cough can use honey (children ages 1 and up) in hot tea or hot water. I recommend putting this in an insulated cup and carrying it around throughout the day to sip on.  Have it at your bedside at night in case you wake up coughing.  You can also use honey cough drops (adults and older children).     Recommend nasal saline for use in children and adults.  Neti Medina can also be helpful.  (Never used tap water and a Neti pot.  Use distilled water.)     If you have plugged up congested ears or the feeling of fluid in your ears, you can use an over-the-counter nasal steroid spray like fluticasone (brand name Flonase) use 2 sprays into each nostril once or twice a day for 7 days.  This will help open up the eustachian tubes and allow the fluid to drain out of your ears.     Sleeping with your head/chest elevated can help with sinus drainage.     Adults only-can use nasal decongestant (like Afrin) at bedtime to open nasal passages so you can breathe through your nose while you sleep; avoid  using for longer than 3 days as this medication can become addicting.  Do not use if you have high blood pressure or high heart rate.     For severe pain or fever in adult-Tylenol (2 extra strength) or ibuprofen (3-4 tabs equals 600 to 800 mg) alternating as needed for pain.  Tylenol doses should be 6 to 8 hours apart and ibuprofen doses should be 6 to 8 hours apart.        Common cold medications for adults explained:     Mucinex-(generic name guaifenesin)-is an expectorant.  This will thin out all the thick mucus.  Must drink plenty of liquids for this medicine to work.     Dextromethorphan (brand name Delsym or DM)-this medicine is a cough suppressant     Honey in hot water or tea-this is a natural cough suppressant     Decongestant nasal spray- (eg: Afrin) use for temporary relief of nasal congestion-best when used at bedtime to open up nasal passages so that you are not forced to mouth breathe overnight.     Sudafed (generic name pseudoephedrine-this must be bought from the pharmacist) DO NOT use this medicine if you have high blood pressure as it can raise your blood pressure higher.  Do not use if you have any irregular heart rate.  This medicine can help clear congestion in your sinuses.

## 2024-12-18 NOTE — PROGRESS NOTES
On Demand Virtual Visit Patient Consent     This visit was completed via video conference. All issues as below were discussed and addressed but no physical exam was performed. If it was felt that the patient should be evaluated in clinic than they were directed there. The patient verbally consented to the visit.    An interactive audio and video telecommunication system which permits real time communications between the patient (at the originating site) and provider (at the distant site) was utilized to provide this telehealth service.   Verbal consent was requested and obtained from Precious Cruz (or parent if under 18) 12/18/24 for a telehealth visit.   I have verbally confirmed with Precious Cruz (or parent if under 18) that they are physically located in the [facility] during this virtual visit.    Subjective   Patient ID: Precious Cruz is a 59 y.o. female who presents for Sinusitis.    Sinusitis  This is a recurrent problem. The current episode started yesterday. The problem has been rapidly worsening since onset. There has been no fever. The fever has been present for Less than 1 day. Her pain is at a severity of 4/10. The pain is moderate. Associated symptoms include congestion, headaches and sinus pressure. Pertinent negatives include no coughing, diaphoresis, neck pain or sore throat. Past treatments include oral decongestants. The treatment provided no relief.        Review of Systems   Constitutional:  Negative for diaphoresis.   HENT:  Positive for congestion and sinus pressure. Negative for sore throat.    Respiratory:  Negative for cough.    Musculoskeletal:  Negative for neck pain.   Neurological:  Positive for headaches.       Objective   There were no vitals taken for this visit.    Physical Exam  Constitutional:       General: She is not in acute distress.     Appearance: Normal appearance. She is ill-appearing.   HENT:      Nose: Congestion present. No rhinorrhea.      Right  Sinus: Maxillary sinus tenderness present. No frontal sinus tenderness.      Left Sinus: Maxillary sinus tenderness present. No frontal sinus tenderness.   Neurological:      Mental Status: She is alert.     Pt seen via video feed to be in no acute distress  Reviewed use of otc/supportive care and sent via pt instruct   All questions were answered and need for follow-up/in person care was reviewed.        Assessment/Plan   Precious was seen today for sinusitis.  Diagnoses and all orders for this visit:  Acute recurrent maxillary sinusitis  -     azithromycin (Zithromax) 250 mg tablet; Take 2 tablets (500 mg) by mouth once daily for 1 day, THEN 1 tablet (250 mg) once daily for 4 days. Take 2 tabs (500 mg) by mouth today, than 1 daily for 4 days..

## 2024-12-20 ENCOUNTER — CLINICAL SUPPORT (OUTPATIENT)
Dept: AUDIOLOGY | Facility: CLINIC | Age: 59
End: 2024-12-20
Payer: COMMERCIAL

## 2024-12-20 DIAGNOSIS — H90.6 MIXED HEARING LOSS, BILATERAL: Primary | ICD-10-CM

## 2024-12-20 PROCEDURE — V5160 DISPENSING FEE BINAURAL: HCPCS

## 2024-12-20 ASSESSMENT — PAIN - FUNCTIONAL ASSESSMENT: PAIN_FUNCTIONAL_ASSESSMENT: 0-10

## 2024-12-20 ASSESSMENT — PAIN SCALES - GENERAL: PAINLEVEL_OUTOF10: 0 - NO PAIN

## 2024-12-20 NOTE — PROGRESS NOTES
AUDIOLOGY HEARING AID FITTING      Name:  Precious Cruz   :  1965   Age:  59 y.o.   Date of Evaluation:  2024     Time: 830-900    HISTORY:  Precious arrived today for hearing aid fitting. A previous hearing evaluation on 10/4/24 indicated mild mixed hearing loss 125-2000 Hz rising to hearing within normal limits 0299-2219 Hz, note conductive component at 1000 Hz in the right ear. In the left ear she has moderate to mild mixed hearing loss 125-2000 Hz rising to hearing within normal limits 7146-9538 Hz. Note conductive component at 1000 and 4000 Hz.    She completed a flex trial of Audeo L90-R devices with a 2S  and small open domes.     HEARING AIDS:    Hearing Aid Information Right Left    Phonak Phonak   Model Audeo I70-R Audeo I70-R   Serial Number [id number]   /Tubing 2 S 2 S   Dome Small Open Small Open   Retention No No   Wax Traps Cerustop Cerustop     Repair Warranty 2028   Loss and Damage Warranty 2028   Fitting Date 2024    Fitting Audiologist Carlo Barron CCC-A  Clinical Audiologist       Paired to Phone for phone calls: No  Paired to smart phone application: Yes  Gain Level: 100%  Volume controls active: Yes  Fit to Audiogram performed on 10/4/2024      HEARING AID ORITENTATION:  Low-battery and volume control signals were demonstrated for the patient.        IMPRESSIONS:  Today's 30 minute hearing aid fitting appointment was spent discussing use, care, and maintenance of the hearing devices. The patient was able to properly insert and remove the hearing instrument from the ear. In addition to today's verbal instruction of the hearing devices, the patient was given written instructions from the hearing aid . Hearing aid limitations were discussed at length as well as realistic expectations. The patient was advised in order to receive full benefit of amplification, consistent use during all waking hours is  recommended. The repair warranty (coverage/cost from the hearing aid  and not the responsibility of [facility]) and the conditions of the 30 day right-to-return period (ending 1/20/24).     Aided testing and verification will be completed at her follow-up appointment.     She has a hearing aid benefit through her insurance. They cover 100% of the hearing aids up to $2500 for the set. She does not need prior authorization or to meet her deductible. This is an in and out of network benefit. The hearing aids would be billed to insurance.       RECOMMENDATIONS:  Strive for full-time device use during waking hours, except when activities preclude device safety.  Return for hearing aid fitting follow-up appointment.  Schedule an appointment for follow-up every year. Call [phone] and request an Audio-Hearing Aid Check Combo appointment with Carlo Barron CCC-A.      PATIENT EDUCATION:  Discussed results and recommendations with Ms. Cruz. Questions were addressed and the patient was encouraged to contact our department at [phone] should concerns arise.       Carlo Barron CCC-A  Clinical Audiologist

## 2025-01-07 ENCOUNTER — APPOINTMENT (OUTPATIENT)
Dept: OBSTETRICS AND GYNECOLOGY | Facility: CLINIC | Age: 60
End: 2025-01-07
Payer: COMMERCIAL

## 2025-01-07 VITALS
BODY MASS INDEX: 32.47 KG/M2 | SYSTOLIC BLOOD PRESSURE: 120 MMHG | DIASTOLIC BLOOD PRESSURE: 72 MMHG | HEIGHT: 66 IN | WEIGHT: 202 LBS

## 2025-01-07 DIAGNOSIS — Z85.3 HISTORY OF LEFT BREAST CANCER: ICD-10-CM

## 2025-01-07 DIAGNOSIS — Z01.419 ENCOUNTER FOR WELL WOMAN EXAM WITH ROUTINE GYNECOLOGICAL EXAM: Primary | ICD-10-CM

## 2025-01-07 PROCEDURE — 1036F TOBACCO NON-USER: CPT | Performed by: OBSTETRICS & GYNECOLOGY

## 2025-01-07 PROCEDURE — 99396 PREV VISIT EST AGE 40-64: CPT | Performed by: OBSTETRICS & GYNECOLOGY

## 2025-01-07 PROCEDURE — 3008F BODY MASS INDEX DOCD: CPT | Performed by: OBSTETRICS & GYNECOLOGY

## 2025-01-07 RX ORDER — METOPROLOL TARTRATE 50 MG/1
TABLET ORAL
COMMUNITY
Start: 2021-05-20

## 2025-01-07 RX ORDER — GABAPENTIN 100 MG/1
CAPSULE ORAL
COMMUNITY

## 2025-01-07 RX ORDER — MELOXICAM 15 MG/1
1 TABLET ORAL
COMMUNITY
Start: 2024-10-07

## 2025-01-07 NOTE — PROGRESS NOTES
"  ASSESSMENT/PLAN    1. Encounter for well woman exam with routine gynecological exam (Primary)  Routine well woman visit.   Your exam was normal today.   H/o hysterectomy, no pap indicated.    2. History of left breast cancer  Breast care through [facility]. Visit and mammogram due 2025. Since will be 10 years since breast CA diagnosis may no longer need to see [facility] and will continue with me for breast CA surveillance.      ----------------------------------------------------------------------    SUBJECTIVE    PAP  HYST  MAMMO 2024  DEXA 2023 T=-1.4  COLON     HPI    60 yo  for routine well woman visit.  Last visit 2023.   Denies any intervening medical or surgical issues.   No more issues with SVT.   h/o hyst  age 38. Has ovaries.  Person h/o left breast CA, Lumpectomy 2015, sentinel node bx negative 10/2015.   Last visit with [facility] 2024 at time of normal mammogram. Went off anastrazole in 2022, after 7 years.   , no smoke, no ETOH.  All children live close to , with grandchildren.       REVIEW OF SYSTEMS    Constitutional: no recent weight gain, no fatigue.   ENT: no hearing loss.  Cardiovascular: no chest pain, no palpitations.  Respiratory: no shortness of breath.  Gastrointestinal: no abdominal pain, no constipation, no nausea, no diarrhea.  Musculoskeletal: no back pain.  Lymphatic: no swollen glands.  Genitourinary: no pelvic pain, no urinary urgency, no urinary incontinence, no change in urinary frequency, no vaginal dryness, no vaginal itching,  no vaginal discharge, no unexplained vaginal bleeding, no lesion/sore.   Breasts: no breast pain, no nipple discharge, no breast lump.   Neurological: no headache, no numbness, no dizziness.   Psychiatric: no sleep disturbances, no anxiety, no depression.   Endocrine: no hot flashes, no loss of hair, no hirsutism.       OBJECTIVE    /72   Ht 1.676 m (5' 6\")   Wt 91.6 kg (202 lb)   BMI 32.60 kg/m²      Physical " Exam     Constitutional: Alert and in no acute distress. Well developed, well nourished   Head and Face: Head and face: normal   Eyes: Normal external exam - nonicteric sclera.  Ears, Nose, Mouth, and Throat: External inspection of ears and nose: normal   Neck: no neck asymmetry. Supple and thyroid not enlarged and there were no palpable thyroid nodules   Cardiovascular: Heart rate and rhythm were normal  Pulmonary: No respiratory distress and clear bilateral breath sounds   Chest: Breasts: normal appearance, Left breast surgical scar unchanged.  no nipple discharge, no skin changes. Palpation of breasts and axillae: no palpable mass, no axillary lymphadenopathy   Abdomen: soft nontender; no abdominal mass palpated, no organomegaly and no hernias   Genitourinary: external genitalia: normal appearing vulva and labia without lesions. No inguinal lymphadenopathy,    Urethra: normal.   Bladder: normal on palpation.   Perianal area: normal   Vagina: normal cuff, without significant discharge, no lesions.  Cervix and uterus absent.  Right and left adnexa/parametria: Normal  Skin: normal skin color and pigmentation, normal skin turgor, and no rash  Psychiatric: alert and oriented x 3., affect normal to patient baseline and mood: appropriate        Lauryn Durham MD

## 2025-01-17 ENCOUNTER — APPOINTMENT (OUTPATIENT)
Dept: AUDIOLOGY | Facility: CLINIC | Age: 60
End: 2025-01-17
Payer: COMMERCIAL

## 2025-02-07 ENCOUNTER — APPOINTMENT (OUTPATIENT)
Dept: AUDIOLOGY | Facility: CLINIC | Age: 60
End: 2025-02-07

## 2025-02-07 DIAGNOSIS — H90.6 MIXED HEARING LOSS, BILATERAL: Primary | ICD-10-CM

## 2025-02-07 ASSESSMENT — PAIN - FUNCTIONAL ASSESSMENT: PAIN_FUNCTIONAL_ASSESSMENT: 0-10

## 2025-02-07 ASSESSMENT — PAIN SCALES - GENERAL: PAINLEVEL_OUTOF10: 0 - NO PAIN

## 2025-02-18 NOTE — PROGRESS NOTES
ADULT HEARING AID CHECK      Name:  Precious Cruz   :  1965   Age:  60 y.o.   Date of Evaluation:  2025     Time: 2078-7945    HISTORY:  Precious Cruz is in for a hearing aid check following her fitting. She is doing well overall, however, she would like to review cleaning and care.       HEARING AIDS:  We reviewed cleaning and care. I demonstrated in-office how to wipe the body of the hearing aid, brush the microphones, change the wax traps, and change the domes. She cleaned on of her hearing aids after watching me clean the other. She showed good understanding of the topics covered today.    Hearing Aid Information Right Left    Phonak Phonak   Model Audeo I70-R Audeo I70-R   Serial Number [id number]   /Tubing 2 S 2 S   Dome Small Open Small Open   Retention No No   Wax Traps Cerustop Cerustop      Repair Warranty 2028   Loss and Damage Warranty 2028   Fitting Date 2024    Fitting Audiologist Carlo Barron CCC-A  Clinical Audiologist         Paired to Phone for phone calls: No  Paired to smart phone application: Yes  Gain Level: 100%  Volume controls active: Yes  Fit to Audiogram performed on 10/4/2024      IMPRESSIONS:  She is doing well with her hearing aids. She should return for hearing aid checks as needed and annually for hearing tests. She was not charged for today's appointment as she is within the first 90 days of fitting.       RECOMMENDATIONS:  1.) Return for hearing aid checks as needed.   2.) Return for annual audiologic assessments.   3.) Continue full-time use of your hearing aids.       Carlo Barron CCC-A  Clinical Audiologist

## 2025-02-24 ENCOUNTER — OFFICE VISIT (OUTPATIENT)
Dept: PRIMARY CARE | Facility: CLINIC | Age: 60
End: 2025-02-24
Payer: COMMERCIAL

## 2025-02-24 ENCOUNTER — APPOINTMENT (OUTPATIENT)
Dept: PRIMARY CARE | Facility: CLINIC | Age: 60
End: 2025-02-24
Payer: COMMERCIAL

## 2025-02-24 ENCOUNTER — TELEPHONE (OUTPATIENT)
Dept: PRIMARY CARE | Facility: CLINIC | Age: 60
End: 2025-02-24

## 2025-02-24 VITALS
TEMPERATURE: 97.9 F | SYSTOLIC BLOOD PRESSURE: 138 MMHG | WEIGHT: 212 LBS | BODY MASS INDEX: 34.22 KG/M2 | OXYGEN SATURATION: 98 % | DIASTOLIC BLOOD PRESSURE: 80 MMHG | HEART RATE: 73 BPM

## 2025-02-24 DIAGNOSIS — N76.0 ACUTE VAGINITIS: ICD-10-CM

## 2025-02-24 DIAGNOSIS — N89.8 VAGINAL ITCHING: Primary | ICD-10-CM

## 2025-02-24 DIAGNOSIS — R35.0 URINARY FREQUENCY: ICD-10-CM

## 2025-02-24 LAB
POC APPEARANCE, URINE: CLEAR
POC BILIRUBIN, URINE: NEGATIVE
POC BLOOD, URINE: NEGATIVE
POC COLOR, URINE: YELLOW
POC GLUCOSE, URINE: NEGATIVE MG/DL
POC KETONES, URINE: ABNORMAL MG/DL
POC LEUKOCYTES, URINE: NEGATIVE
POC NITRITE,URINE: NEGATIVE
POC PH, URINE: 6.5 PH
POC PROTEIN, URINE: NEGATIVE MG/DL
POC SPECIFIC GRAVITY, URINE: 1.02
POC UROBILINOGEN, URINE: 0.2 EU/DL

## 2025-02-24 PROCEDURE — 81003 URINALYSIS AUTO W/O SCOPE: CPT | Performed by: FAMILY MEDICINE

## 2025-02-24 PROCEDURE — 1036F TOBACCO NON-USER: CPT | Performed by: FAMILY MEDICINE

## 2025-02-24 PROCEDURE — 99213 OFFICE O/P EST LOW 20 MIN: CPT | Performed by: FAMILY MEDICINE

## 2025-02-24 RX ORDER — FLUCONAZOLE 150 MG/1
TABLET ORAL
Qty: 2 TABLET | Refills: 0 | Status: SHIPPED | OUTPATIENT
Start: 2025-02-24

## 2025-02-24 RX ORDER — NITROFURANTOIN 25; 75 MG/1; MG/1
100 CAPSULE ORAL 2 TIMES DAILY
Qty: 10 CAPSULE | Refills: 0 | Status: SHIPPED | OUTPATIENT
Start: 2025-02-24 | End: 2025-03-01

## 2025-02-24 ASSESSMENT — ENCOUNTER SYMPTOMS
FREQUENCY: 1
SHORTNESS OF BREATH: 0
DIZZINESS: 0
FEVER: 0
SLEEP DISTURBANCE: 0
CHILLS: 0
LIGHT-HEADEDNESS: 0
ACTIVITY CHANGE: 0
APPETITE CHANGE: 0
ABDOMINAL PAIN: 0
BACK PAIN: 1

## 2025-02-24 NOTE — TELEPHONE ENCOUNTER
Patient has an appt on 4/8/2025 is having UTI sxs Lower back pain, frequency, itching. The only appt is on Thursday this week Please Advise

## 2025-02-24 NOTE — PROGRESS NOTES
Subjective   Patient ID: Precious Cruz is a 60 y.o. female who presents for Back Pain (Discuss lower back pain x1 day. NKI) and Urinary Frequency (Urinary frequency and vaginal itching x1 week).    Back Pain  Pertinent negatives include no abdominal pain, chest pain or fever.   Urinary Frequency   Associated symptoms include frequency. Pertinent negatives include no chills.      Uti  Has been happening for 1 wk   Novaginal discharge   +vaginal itching  Dysuria     No vaginal bleeding  Review of Systems   Constitutional:  Negative for activity change, appetite change, chills and fever.   Eyes:  Negative for visual disturbance.   Respiratory:  Negative for shortness of breath.    Cardiovascular:  Negative for chest pain.   Gastrointestinal:  Negative for abdominal pain.   Genitourinary:  Positive for frequency.   Musculoskeletal:  Positive for back pain.   Skin:  Negative for rash.   Neurological:  Negative for dizziness and light-headedness.   Psychiatric/Behavioral:  Negative for sleep disturbance.        Objective   /80   Pulse 73   Temp 36.6 °C (97.9 °F)   Wt 96.2 kg (212 lb)   SpO2 98%   BMI 34.22 kg/m²     Physical Exam  Constitutional:       Appearance: Normal appearance.   Eyes:      Pupils: Pupils are equal, round, and reactive to light.   Cardiovascular:      Rate and Rhythm: Normal rate and regular rhythm.   Pulmonary:      Effort: Pulmonary effort is normal.      Breath sounds: Normal breath sounds.   Abdominal:      General: Abdomen is flat. Bowel sounds are normal.      Palpations: Abdomen is soft.   Musculoskeletal:         General: Normal range of motion.   Skin:     General: Skin is warm.   Neurological:      General: No focal deficit present.      Mental Status: She is alert.   Psychiatric:         Mood and Affect: Mood normal.         Assessment/Plan   Assessment & Plan  Urinary frequency  Will send for urine culture     Orders:    POCT UA Automated manually resulted     nitrofurantoin, macrocrystal-monohydrate, (Macrobid) 100 mg capsule; Take 1 capsule (100 mg) by mouth 2 times a day for 5 days.    Urine Culture; Future    Vaginal itching         Acute vaginitis    Orders:    fluconazole (Diflucan) 150 mg tablet; Take 1 tab and if symptoms of itchiness persist after 24 hrs, take another tab

## 2025-02-26 LAB — BACTERIA UR CULT: NORMAL

## 2025-03-27 ENCOUNTER — CLINICAL SUPPORT (OUTPATIENT)
Dept: AUDIOLOGY | Facility: CLINIC | Age: 60
End: 2025-03-27

## 2025-03-27 DIAGNOSIS — H90.6 MIXED HEARING LOSS, BILATERAL: Primary | ICD-10-CM

## 2025-03-27 ASSESSMENT — PAIN - FUNCTIONAL ASSESSMENT: PAIN_FUNCTIONAL_ASSESSMENT: 0-10

## 2025-03-27 ASSESSMENT — PAIN SCALES - GENERAL: PAINLEVEL_OUTOF10: 0 - NO PAIN

## 2025-03-27 NOTE — PROGRESS NOTES
ADULT HEARING AID CHECK      Name:  Precious Cruz   :  1965   Age:  60 y.o.   Date of Evaluation:  3/27/2025     Time: 930-1000      HISTORY:  Precious is here for a hearing aid check. She noted her left hearing aid stopped functioning on Tuesday. She reported that there is a solid red light in the . She attempted a hard reset and it did not resolve the issue.       HEARING AIDS:  A hard reset was attempted again in-office and it did not resolve the issue. Her  was checked and is working properly. A loaner hearing aid was programmed for her while her device is out for repair. She was re-paired to her adrián and the phone.     Hearing Aid Information Right Left    Phonak Phonak   Model Audeo I70-R Audeo I70-R   Serial Number [id number]   /Tubing 2 S 2 S   Dome Small Open Small Open   Retention No No   Wax Traps Cerustop Cerustop      Repair Warranty 2028   Loss and Damage Warranty 2028   Fitting Date 2024    Fitting Audiologist Carlo Barron CCC-A  Clinical Audiologist         Paired to Phone for phone calls: Yes  Paired to smart phone application: Yes  Gain Level: 100%  Volume controls active: Yes  Fit to Audiogram performed on 10/4/2024      IMPRESSIONS:  She should return for her hearing aid pick-up appointment which will be scheduled when her device arrives from repair. This is her last appointment prior to being out of her first 90 days of fitting. After she picks up her repaired hearing aid, appointments will incur office visit fees.       RECOMMENDATIONS:  1.) Continue full-time use of hearing aids.  2.) Return for your hearing aid pick-up appointment when device arrives from repair.       Carlo Barron CCC-A  Clinical Audiologist

## 2025-04-08 ENCOUNTER — APPOINTMENT (OUTPATIENT)
Dept: PRIMARY CARE | Facility: CLINIC | Age: 60
End: 2025-04-08
Payer: COMMERCIAL

## 2025-04-08 VITALS
WEIGHT: 215 LBS | DIASTOLIC BLOOD PRESSURE: 78 MMHG | BODY MASS INDEX: 34.7 KG/M2 | SYSTOLIC BLOOD PRESSURE: 110 MMHG | TEMPERATURE: 98.2 F | HEART RATE: 81 BPM | OXYGEN SATURATION: 98 %

## 2025-04-08 DIAGNOSIS — Z13.0 SCREENING, ANEMIA, DEFICIENCY, IRON: ICD-10-CM

## 2025-04-08 DIAGNOSIS — Z13.1 SCREENING FOR DIABETES MELLITUS: ICD-10-CM

## 2025-04-08 DIAGNOSIS — F51.01 PRIMARY INSOMNIA: ICD-10-CM

## 2025-04-08 DIAGNOSIS — M54.50 ACUTE BILATERAL LOW BACK PAIN WITHOUT SCIATICA: Primary | ICD-10-CM

## 2025-04-08 DIAGNOSIS — Z13.220 LIPID SCREENING: ICD-10-CM

## 2025-04-08 PROCEDURE — 99214 OFFICE O/P EST MOD 30 MIN: CPT | Performed by: FAMILY MEDICINE

## 2025-04-08 PROCEDURE — 1036F TOBACCO NON-USER: CPT | Performed by: FAMILY MEDICINE

## 2025-04-08 RX ORDER — ZOLPIDEM TARTRATE 10 MG/1
10 TABLET ORAL NIGHTLY PRN
Qty: 90 TABLET | Refills: 1 | Status: SHIPPED | OUTPATIENT
Start: 2025-04-08 | End: 2025-10-05

## 2025-04-08 RX ORDER — SEMAGLUTIDE 0.25 MG/.5ML
0.25 INJECTION, SOLUTION SUBCUTANEOUS WEEKLY
Qty: 2 ML | Refills: 0 | Status: SHIPPED | OUTPATIENT
Start: 2025-04-08 | End: 2025-04-30

## 2025-04-08 ASSESSMENT — ENCOUNTER SYMPTOMS
ABDOMINAL PAIN: 0
CHILLS: 0
LIGHT-HEADEDNESS: 0
DIZZINESS: 0
APPETITE CHANGE: 0
ACTIVITY CHANGE: 0
SHORTNESS OF BREATH: 0
FEVER: 0
SLEEP DISTURBANCE: 0

## 2025-04-08 NOTE — PATIENT INSTRUCTIONS
The Clinical Pharmacy Team has been consulted by your doctor to help with medication selection and adjustment.    They are the only ones who- working with your Family Doctor-  can actually prescribe a med to see what is COVERED under your specific plan , what is available/in stock, and then do follow up telephone visits with patients between office visits to check in and make adjustments to prescriptions.       They prescribe and adjust based on how well it's working,  what side effects may be occurring, cost changes, and then remove/decrease other meds to simplify list if good control with new agents, etc.     They can even look to see if a medicine that might be expensive at the patient's pharmacy might be cheaper if we sent it from one of our  pharmacies to the patient's home rather than going through a retail pharmacy, but they work with retail pharmacies like [facility], [facility], [facility], etc as well.    They send messages to your pcp at each interaction with you to share the plan, and reach out to us between visits if there are any concerns or with med adjustments.        They look at the entirety of the patient's medication list  (not just meds for diabetes /heart etc) to look for potential interactions, what has worked and what has not in the past,  what meds you can't tolerate, etc, They look at your liver and kidney labs to see what meds would work with your body's ability to break down the medicine appropriately, etc    It's an incredible service to our patients to help them optimize control of their chronic conditions like diabetes or high blood pressure -  get them the best med for the lowest cost with the fewest side effects.       They will be reaching out to you by phone.

## 2025-04-08 NOTE — PROGRESS NOTES
Subjective   Patient ID: Precious Cruz is a 60 y.o. female who presents for Hypertension (Recheck ) and GERD.    HPI   L breast cancer   This is her 10 yr anniversary for this  S/p lumpectomy and radiation   The patient is taking gabapentin 100 mg as needed for restless legs, primarily at night    ER/hospitalization   she had right pupil dialtion and went to ER to rule out stroke. the onyl thing that she can think of was singulair and she took ibuprofen with it. allergy med updated. her work up in ED was normal. She saw opthalmology and was told taht singulair was not the cause of this but per pt, this med is the onyl change.     Low back pain   Located in right lumbar area   Intermittent  Does not wake up with the pain and pain does not wake pt up at night  Pt able to get in a comfortable position   Pt in control or her urine and bowels  Denoed sacral anesthesia  Denied numbness and tongling of LE   Review of Systems   Constitutional:  Negative for activity change, appetite change, chills and fever.   Eyes:  Negative for visual disturbance.   Respiratory:  Negative for shortness of breath.    Cardiovascular:  Negative for chest pain.   Gastrointestinal:  Negative for abdominal pain.   Skin:  Negative for rash.   Neurological:  Negative for dizziness and light-headedness.   Psychiatric/Behavioral:  Negative for sleep disturbance.        Objective   /78   Pulse 81   Temp 36.8 °C (98.2 °F)   Wt 97.5 kg (215 lb)   SpO2 98%   BMI 34.70 kg/m²     Physical Exam  Constitutional:       Appearance: Normal appearance.   Eyes:      Pupils: Pupils are equal, round, and reactive to light.   Cardiovascular:      Rate and Rhythm: Normal rate and regular rhythm.   Pulmonary:      Effort: Pulmonary effort is normal.      Breath sounds: Normal breath sounds.   Abdominal:      General: Abdomen is flat. Bowel sounds are normal.      Palpations: Abdomen is soft.   Musculoskeletal:         General: Normal range of motion.    Skin:     General: Skin is warm.   Neurological:      General: No focal deficit present.      Mental Status: She is alert.   Psychiatric:         Mood and Affect: Mood normal.         Assessment/Plan   Assessment & Plan  Acute bilateral low back pain without sciatica  Worsening. Will refer to prtho   Orders:    XR lumbar spine 2-3 views; Future    Referral to Orthopedics and Sports Medicine; Future    Primary insomnia  Stable   Orders:    zolpidem (Ambien) 10 mg tablet; Take 1 tablet (10 mg) by mouth as needed at bedtime for sleep.    BMI 35.0-35.9,adult  Advised of GLP-1 adverse effects  - acute kidney injury requiring dialysis  - gallbladder disease  - immediate hypersensitivity reactions, including anaphylaxis and angioedema  -  Pancreatitis  - Nausea, constipation   - contraindication in personal or family history of MEN syndrome or MTC    Interested in trying to see if this is covered again since the wieght gain is worsening the lbp and the lbp is limiting her activities   Orders:    Referral to Clinical Pharmacy; Future    semaglutide, weight loss, (Wegovy) 0.25 mg/0.5 mL pen injector; Inject 0.25 mg under the skin 1 (one) time per week for 4 doses.    Lipid screening    Orders:    Lipid Panel; Future    Screening, anemia, deficiency, iron    Orders:    CBC and Auto Differential; Future    Screening for diabetes mellitus    Orders:    Comprehensive Metabolic Panel; Future

## 2025-04-14 ENCOUNTER — APPOINTMENT (OUTPATIENT)
Dept: PHARMACY | Facility: HOSPITAL | Age: 60
End: 2025-04-14
Payer: COMMERCIAL

## 2025-04-14 ENCOUNTER — APPOINTMENT (OUTPATIENT)
Facility: CLINIC | Age: 60
End: 2025-04-14
Payer: COMMERCIAL

## 2025-04-14 ENCOUNTER — HOSPITAL ENCOUNTER (OUTPATIENT)
Dept: RADIOLOGY | Facility: CLINIC | Age: 60
Discharge: HOME | End: 2025-04-14

## 2025-04-14 ENCOUNTER — CLINICAL SUPPORT (OUTPATIENT)
Dept: AUDIOLOGY | Facility: CLINIC | Age: 60
End: 2025-04-14

## 2025-04-14 ENCOUNTER — PATIENT MESSAGE (OUTPATIENT)
Dept: PHARMACY | Facility: HOSPITAL | Age: 60
End: 2025-04-14

## 2025-04-14 VITALS — WEIGHT: 200 LBS | BODY MASS INDEX: 32.14 KG/M2 | HEIGHT: 66 IN

## 2025-04-14 DIAGNOSIS — H90.6 MIXED HEARING LOSS, BILATERAL: Primary | ICD-10-CM

## 2025-04-14 DIAGNOSIS — M54.50 ACUTE BILATERAL LOW BACK PAIN WITHOUT SCIATICA: ICD-10-CM

## 2025-04-14 PROCEDURE — 99203 OFFICE O/P NEW LOW 30 MIN: CPT | Performed by: PHYSICIAN ASSISTANT

## 2025-04-14 PROCEDURE — 72110 X-RAY EXAM L-2 SPINE 4/>VWS: CPT

## 2025-04-14 PROCEDURE — 1036F TOBACCO NON-USER: CPT | Performed by: PHYSICIAN ASSISTANT

## 2025-04-14 PROCEDURE — 72110 X-RAY EXAM L-2 SPINE 4/>VWS: CPT | Performed by: RADIOLOGY

## 2025-04-14 PROCEDURE — 3008F BODY MASS INDEX DOCD: CPT | Performed by: PHYSICIAN ASSISTANT

## 2025-04-14 RX ORDER — TIRZEPATIDE 2.5 MG/.5ML
2.5 INJECTION, SOLUTION SUBCUTANEOUS
Qty: 2 ML | Refills: 0 | Status: SHIPPED | OUTPATIENT
Start: 2025-04-14 | End: 2025-04-16 | Stop reason: SDUPTHER

## 2025-04-14 ASSESSMENT — PAIN - FUNCTIONAL ASSESSMENT: PAIN_FUNCTIONAL_ASSESSMENT: 0-10

## 2025-04-14 NOTE — PROGRESS NOTES
Chief Complaint: Low back pain    HPI: Precious Cruz is a 60 y.o. female patient presenting with PMH of L breast cancer presenting today with bilateral lower back pain that started approximately 1 year ago. She attributed the pain to her recent weight gain. She reports years of clicking and popping of the right hip joint. She describes the pain as dull and burning that starts with walking and gets better with rest and stretching. She states that she is very active. She does yoga, runs, and weight lifting regularly. She used to walk 3 miles on average and now she only does 3/4 a mile and has to stop due to the back pain. She has tried Meloxicam and shoe support that has not improved her symptoms. She denies weakness, saddle paraesthesia, and radicular symptoms. She has full control of her bowel and bladder.    No anticoagulations  Negative tobacco use    Review of Systems    All other systems have been reviewed and are negative for complaint. All pertinent positive and negative as listed in history of present illness.    Past Medical History:   Diagnosis Date    Anxiety disorder, unspecified     Anxiety    Encounter for full-term uncomplicated delivery      (spontaneous vaginal delivery)    Personal history of malignant neoplasm of breast     History of malignant neoplasm of breast    Personal history of other infectious and parasitic diseases     History of shingles    Personal history of other specified conditions     History of palpitations    Varicella without complication     Varicella without complication        Past Surgical History:   Procedure Laterality Date    BREAST LUMPECTOMY Left 2016    Breast Surgery Lumpectomy    HYSTERECTOMY  2014    Hysterectomy    OTHER SURGICAL HISTORY  2017    Hand Repair    SENTINEL LYMPH NODE BIOPSY  2016    [place] Lymph Node Biopsy    SHOULDER SURGERY  2015    Shoulder Surgery        Allergies   Allergen Reactions    Penicillins Unknown         Current Outpatient Medications on File Prior to Visit   Medication Sig Dispense Refill    ALPRAZolam (Xanax) 0.25 mg tablet Take 1 tablet (0.25 mg) by mouth once daily as needed for anxiety.      b complex 0.4 mg tablet Take 1 tablet by mouth once daily.      BACILLUS COAGULANS-INULIN ORAL Take by mouth.      berberine/herbal complex no.18 (BERBERINE-HERBAL COMB NO.18 ORAL) Take 1 capsule by mouth once daily.      biotin 2,500 mcg capsule Take 1 capsule (2.5 mg) by mouth once daily.      calcium citrate-vitamin D3 (Citracal+D) 315 mg-5 mcg (200 unit) tablet Take 1 tablet by mouth once daily.      cholecalciferol (Vitamin D3) 50 MCG (2000 UT) tablet Take 1 tablet (50 mcg) by mouth once daily.      fluticasone (Flonase) 50 mcg/actuation nasal spray Administer 2 sprays into each nostril once daily. Shake gently. Before first use, prime pump. After use, clean tip and replace cap. 16 g 3    gabapentin (Neurontin) 100 mg capsule Take by mouth if needed.      glucosamine sul-chondroitn-msm 500-400-200 mg tablet Take 1 tablet by mouth once daily.      meloxicam (Mobic) 15 mg tablet Take 1 tablet (15 mg) by mouth if needed.      metoprolol tartrate (Lopressor) 50 mg tablet Take by mouth if needed.      multivitamin tablet Take 1 tablet by mouth once daily.      NON FORMULARY Take 1 each by mouth 2 times a day. gymnema-gisele      omeprazole (PriLOSEC) 20 mg DR capsule Take 1 capsule (20 mg) by mouth once daily in the morning. Take before meals. Do not crush or chew. 30 capsule 3    pseudoephedrine-guaifenesin (Mucinex D)  mg 12 hr tablet Take 1 tablet by mouth every 12 hours. Do not crush, chew, or split. 60 tablet 2    semaglutide, weight loss, (Wegovy) 0.25 mg/0.5 mL pen injector Inject 0.25 mg under the skin 1 (one) time per week for 4 doses. 2 mL 0    turmeric root extract 500 mg tablet Take 1 tablet by mouth once daily.      vitamin E 450 mg (1000 unit) capsule Take 1 capsule (1,000 Units) by mouth once  daily.      zolpidem (Ambien) 10 mg tablet Take 1 tablet (10 mg) by mouth as needed at bedtime for sleep. 90 tablet 1    [DISCONTINUED] fluconazole (Diflucan) 150 mg tablet Take 1 tab and if symptoms of itchiness persist after 24 hrs, take another tab 2 tablet 0    [DISCONTINUED] zolpidem (Ambien) 10 mg tablet Take 1 tablet (10 mg) by mouth as needed at bedtime for sleep.       No current facility-administered medications on file prior to visit.          PE:   Const: Well-appearing, well-nourished overweight female in no distress.  Eyes: Normal appearing sclera and conjunctiva, no jaundice, pupils normal in appearance.  Resp: breathing comfortably, normal respiratory rate.  CV: No upper or lower extremity edema.  Musculoskeletal: Normal gait.  Lumbar ROM is supple.  Strength exam of the lower extremities reveals 5/5 strength in all major muscle groups.  Negative straight leg raise bilaterally.  Neuro: Sensation is intact and equal bilaterally. Deep tendon reflexes are normal and symmetric.  No clonus.  Skin: Intact without any lesions, normal turgor.  Psych: Alert and oriented x3, normal mood and affect.      Imaging:    No recent spine imaging.  Her primary care provider did order lumbar plain films.  She has not had these done.  I did recommend she had over to radiology to have these completed today.       A/P:    I discussed the natural history and treatment options for low back pain, including weight loss, a good aerobic conditioning program, and core strengthening exercises.  I also recommended the use of anti-inflammatory medication, especially prior to performing vigorous activities.  She has tried meloxicam and prefers Tylenol arthritis. Unfortunately there is no surgical cure for low back pain, and we discussed this at length.  The patient was given a prescription for PT.  I am optimistic the pain will improve with conservative management.  I would be happy to see her back in the future if new symptoms  develop, especially pain radiating down the legs or trouble ambulating.    **This note was dictated using speech recognition software and was not corrected for spelling or grammatical errors**

## 2025-04-14 NOTE — PROGRESS NOTES
ADULT HEARING AID CHECK      Name:  Precious Cruz   :  1965   Age:  60 y.o.   Date of Evaluation:  2025     Time: 0290-1378      HISTORY:  Precious Cruz is in for a hearing aid pick-up. Recall, her left hearing aid was sent out for repair as it stopped functioning and was displaying a solid red light in the .     HEARING AIDS:  Her hearing aids were programmed to her fitting from 25. She was re-paired to her phone and the adrián. The  replaced the electronics and color coding.     Hearing Aid Information Right Left    Phonak Phonak   Model Audeo I70-R Audeo I70-R   Serial Number [id number]   /Tubing 2 S 2 S   Dome Small Open Small Open   Retention No No   Wax Traps Cerustop Cerustop      Repair Warranty 2028   Loss and Damage Warranty 2028   Fitting Date 2024    Fitting Audiologist Carlo Barron CCC-A  Clinical Audiologist         Paired to Phone for phone calls: Yes  Paired to smart phone application: Yes  Gain Level: 100%  Volume controls active: Yes  Fit to Audiogram performed on 10/4/2024       IMPRESSIONS:  She should continue full-time use of hearing aids. She should return as needed for hearing aid checks and annually for hearing tests.     Appointments will now incur office visit fees as she is out of her 90 day fitting period.       RECOMMENDATIONS:  1.) Continue full-time use of hearing aids.   2.) Return as needed for hearing aid checks.  3.) Return for annual hearing in 2025.       Carlo Barron CCC-A  Clinical Audiologist

## 2025-04-14 NOTE — PROGRESS NOTES
Clinical Pharmacy Appointment    Patient ID: Precious Cruz is a 60 y.o. female who presents for Obesity.    Pt is here for First appointment.     Referring Provider: Odalys Palma MD  PCP: Odalys Palma MD   Last visit with PCP: 4/8/25   Next visit with PCP: 10/9/25      Subjective     Interval History  Medical [place] insurance   [facility]-  works here.     HPI    OBESITY ASSESSMENT  Current Medications:   None    Previous Medications:  None    Pertinent PMH:   Personal/family history of thyroid cancer: No  Personal history of pancreatitis: No  CAD: No  Heart attack: No  Stroke: No  Sleep apnea: No  Hyperlipidemia: No  Hypertension: No  PCOS: No  Fatty liver disease: No  Gallstones: No  Pre-diabetes: No    Weight monitoring:   Current weight 215 lbs (current BMI 34.70) - Obesity (class 1): BMI 30 to <35    Lifestyle:  Exercise: Weight lifting 3 days per week. Also walks and does yoga.    The ASCVD Risk score (Mila OROZCO, et al., 2019) failed to calculate for the following reasons:    Cannot find a previous HDL lab    Cannot find a previous total cholesterol lab     Drug Interactions  No relevant drug interactions were noted.    Medication System Management  Patient's preferred pharmacy: [facility]  Adherence/Organization: No concerns  Affordability/Accessibility:  GLP1 coverage issues    Objective   Allergies   Allergen Reactions    Penicillins Unknown     Social History     Social History Narrative    Not on file      Medication Review  Current Outpatient Medications   Medication Instructions    ALPRAZolam (XANAX) 0.25 mg, Daily PRN    b complex 0.4 mg tablet 1 tablet, Daily    BACILLUS COAGULANS-INULIN ORAL Take by mouth.    berberine/herbal complex no.18 (BERBERINE-HERBAL COMB NO.18 ORAL) 1 capsule, Daily    biotin 2,500 mcg capsule 1 capsule, Daily    calcium citrate-vitamin D3 (Citracal+D) 315 mg-5 mcg (200 unit) tablet 1 tablet, Daily    cholecalciferol  "(VITAMIN D3) 50 mcg, Daily    fluticasone (Flonase) 50 mcg/actuation nasal spray 2 sprays, Each Nostril, Daily, Shake gently. Before first use, prime pump. After use, clean tip and replace cap.    gabapentin (Neurontin) 100 mg capsule As needed    glucosamine sul-chondroitn-msm 500-400-200 mg tablet 1 tablet, Daily    meloxicam (Mobic) 15 mg tablet 1 tablet, As needed    metoprolol tartrate (Lopressor) 50 mg tablet As needed    multivitamin tablet 1 tablet, Daily    NON FORMULARY 1 each, 2 times daily    omeprazole (PRILOSEC) 20 mg, oral, Daily before breakfast, Do not crush or chew.    pseudoephedrine-guaifenesin (Mucinex D)  mg 12 hr tablet 1 tablet, oral, Every 12 hours, Do not crush, chew, or split.    turmeric root extract 500 mg tablet 1 tablet, Daily    vitamin E 1,000 Units, Daily    Zepbound 2.5 mg, subcutaneous, Every 7 days    zolpidem (AMBIEN) 10 mg, oral, Nightly PRN      Vitals  BP Readings from Last 3 Encounters:   04/08/25 110/78   02/24/25 138/80   01/07/25 120/72     Labs  A1C  No results found for: \"HGBA1C\"  BMP  Lab Results   Component Value Date    CALCIUM 9.5 04/16/2024     04/16/2024    K 3.5 04/16/2024    CO2 25 04/16/2024     04/16/2024    BUN 15 04/16/2024    CREATININE 0.92 04/16/2024    EGFR 71 04/16/2024     LFTs  Lab Results   Component Value Date    ALT 23 04/16/2024    AST 29 04/16/2024    ALKPHOS 49 04/16/2024    BILITOT 0.5 04/16/2024     FLP  Lab Results   Component Value Date    TRIG 144 06/22/2020    CHOL 213 (H) 06/22/2020    LDLF 110 (H) 06/22/2020    HDL 74.2 06/22/2020     Urine Microalbumin  No results found for: \"MICROALBCREA\"  Weight Management  Wt Readings from Last 3 Encounters:   04/14/25 90.7 kg (200 lb)   04/08/25 97.5 kg (215 lb)   02/24/25 96.2 kg (212 lb)      BMI Readings from Last 3 Encounters:   04/14/25 32.28 kg/m²   04/08/25 34.70 kg/m²   02/24/25 34.22 kg/m²        Assessment/Plan   Problem List Items Addressed This Visit    None  Visit " Diagnoses       BMI 35.0-35.9,adult        Relevant Medications    tirzepatide, weight loss, (Zepbound) 2.5 mg/0.5 mL solution    Other Relevant Orders    Referral to Clinical Pharmacy        Current weight 215 lbs (current BMI 34.70) - Obesity (class 1): BMI 30 to <35. Patient is actively implementing positive lifestyle modifications (Ex. Reduced-calorie diet, increase physical activity).     Zepbound and Wegovy are NOT covered [employer] [place] insurance. Reviewed GLP1 agonists out-of-pocket costs with patient (Zepbound: $349/month for 2.5 mg vials, $499/month for 5 mg, 7.5 mg, and 10 mg vials; $650/month for Zepbound PENS (all strengths); Wegovy $499/month (all strengths)). Patient wishes to proceed with treatment through [facility] at this time. Provided education on medication mechanism of action, side effects, administration, and monitoring if applicable.    Plan:  START Zepbound 2.5 mg weekly x 4 weeks     Clinical Pharmacist follow-up: 4 weeks, Telehealth visit    Future Appointments   Date Time Provider Department Center   5/12/2025 11:20 AM Jade Girffin V PharmSUNITA [id number] [facility]   8/8/2025  8:[facility] PABLITO [facility]   8/8/2025  9:30 AM KANDI Mcelroy-CNP [id number] [facility]   10/9/2025  9:10 AM Odalys Palma MD [id number] [facility]       Continue all meds under the continuation of care with the referring provider and clinical pharmacy team.    Thank you,  Jade Griffin  Clinical Pharmacist  [phone]    Verbal consent to manage patient's drug therapy was obtained from the patient. They were informed they may decline to participate or withdraw from participation in pharmacy services at any time.

## 2025-04-16 RX ORDER — TIRZEPATIDE 2.5 MG/.5ML
2.5 INJECTION, SOLUTION SUBCUTANEOUS
Qty: 2 ML | Refills: 0 | Status: SHIPPED | OUTPATIENT
Start: 2025-04-16

## 2025-05-05 ENCOUNTER — TELEMEDICINE (OUTPATIENT)
Dept: PHARMACY | Facility: HOSPITAL | Age: 60
End: 2025-05-05
Payer: COMMERCIAL

## 2025-05-05 RX ORDER — TIRZEPATIDE 2.5 MG/.5ML
2.5 INJECTION, SOLUTION SUBCUTANEOUS
Qty: 2 ML | Refills: 0 | Status: SHIPPED | OUTPATIENT
Start: 2025-05-05

## 2025-05-05 NOTE — PROGRESS NOTES
Clinical Pharmacy Appointment    Patient ID: Precious Cruz is a 60 y.o. female who presents for Obesity.    Pt is here for Follow Up appointment.     Referring Provider: Odalys Palma MD  PCP: Odalys Palma MD   Last visit with PCP: 4/8/25   Next visit with PCP: 10/9/25      Subjective     Interval History  Medical [place] insurance   [facility]-  works here.     HPI    OBESITY ASSESSMENT  Current Medications:   Zepbound 2.5 mg weekly (3 doses so far; Sunday)    Side effects: As of lately, tolerable upset stomach. Side effects improved/resolved with minimizing size of meals, reducing alcohol consumption, and increasing water intake. No diarrhea or vomiting after 2nd and 3rd dose. After the first dose: Felt nauseous, worsening acid reflux, belching, flatulence, and one episode of vomiting.     Previous Medications:  None    Pertinent PMH:   Personal/family history of thyroid cancer: No  Personal history of pancreatitis: No  CAD: No  Heart attack: No  Stroke: No  Sleep apnea: No  Hyperlipidemia: No  Hypertension: No  PCOS: No  Fatty liver disease: No  Gallstones: No  Pre-diabetes: No    Weight monitoring:   Starting weight 215 lbs (current BMI 34.70) - Obesity (class 1): BMI 30 to <35    Lifestyle:  Exercise: Weight lifting 3 days per week. Also walks and does yoga.    The ASCVD Risk score ([place] DK, et al., 2019) failed to calculate for the following reasons:    Cannot find a previous HDL lab    Cannot find a previous total cholesterol lab     Drug Interactions  No relevant drug interactions were noted.    Medication System Management  Patient's preferred pharmacy: [facility]  Adherence/Organization: No concerns  Affordability/Accessibility: Paying out-of-pocket for Zepbound    Objective   Allergies   Allergen Reactions    Penicillins Unknown     Social History     Social History Narrative    Not on file      Medication Review  Current Outpatient Medications  "  Medication Instructions    ALPRAZolam (XANAX) 0.25 mg, Daily PRN    b complex 0.4 mg tablet 1 tablet, Daily    BACILLUS COAGULANS-INULIN ORAL Take by mouth.    berberine/herbal complex no.18 (BERBERINE-HERBAL COMB NO.18 ORAL) 1 capsule, Daily    biotin 2,500 mcg capsule 1 capsule, Daily    calcium citrate-vitamin D3 (Citracal+D) 315 mg-5 mcg (200 unit) tablet 1 tablet, Daily    cholecalciferol (VITAMIN D3) 50 mcg, Daily    fluticasone (Flonase) 50 mcg/actuation nasal spray 2 sprays, Each Nostril, Daily, Shake gently. Before first use, prime pump. After use, clean tip and replace cap.    gabapentin (Neurontin) 100 mg capsule As needed    glucosamine sul-chondroitn-msm 500-400-200 mg tablet 1 tablet, Daily    meloxicam (Mobic) 15 mg tablet 1 tablet, As needed    metoprolol tartrate (Lopressor) 50 mg tablet As needed    multivitamin tablet 1 tablet, Daily    NON FORMULARY 1 each, 2 times daily    omeprazole (PRILOSEC) 20 mg, oral, Daily before breakfast, Do not crush or chew.    pseudoephedrine-guaifenesin (Mucinex D)  mg 12 hr tablet 1 tablet, oral, Every 12 hours, Do not crush, chew, or split.    turmeric root extract 500 mg tablet 1 tablet, Daily    vitamin E 1,000 Units, Daily    Zepbound 2.5 mg, subcutaneous, Every 7 days    zolpidem (AMBIEN) 10 mg, oral, Nightly PRN      Vitals  BP Readings from Last 3 Encounters:   04/08/25 110/78   02/24/25 138/80   01/07/25 120/72     Labs  A1C  No results found for: \"HGBA1C\"  BMP  Lab Results   Component Value Date    CALCIUM 9.5 04/16/2024     04/16/2024    K 3.5 04/16/2024    CO2 25 04/16/2024     04/16/2024    BUN 15 04/16/2024    CREATININE 0.92 04/16/2024    EGFR 71 04/16/2024     LFTs  Lab Results   Component Value Date    ALT 23 04/16/2024    AST 29 04/16/2024    ALKPHOS 49 04/16/2024    BILITOT 0.5 04/16/2024     FLP  Lab Results   Component Value Date    TRIG 144 06/22/2020    CHOL 213 (H) 06/22/2020    LDLF 110 (H) 06/22/2020    HDL 74.2 " "06/22/2020     Urine Microalbumin  No results found for: \"MICROALBCREA\"  Weight Management  Wt Readings from Last 3 Encounters:   04/14/25 90.7 kg (200 lb)   04/08/25 97.5 kg (215 lb)   02/24/25 96.2 kg (212 lb)      BMI Readings from Last 3 Encounters:   04/14/25 32.28 kg/m²   04/08/25 34.70 kg/m²   02/24/25 34.22 kg/m²        Assessment/Plan   Problem List Items Addressed This Visit    None  Visit Diagnoses         BMI 35.0-35.9,adult        Relevant Medications    tirzepatide, weight loss, (Zepbound) 2.5 mg/0.5 mL solution    Other Relevant Orders    Referral to Clinical Pharmacy        Starting weight 215 lbs (current BMI 34.70) - Obesity (class 1): BMI 30 to <35. Patient is actively implementing positive lifestyle modifications (Ex. Reduced-calorie diet, increase physical activity). Patient recently starting Zepbound. Patient messaged pharmacist after first dose with intolerance to medication including nausea, belching, flatulence, worsening acid reflux, and one episode of vomiting. Pharmacist recommended avoidance of specific foods/beverages (greasy, acidic, and high sugar-foods and alcohol), smaller portions, and increased water intake. Patient tolerated the 2nd and 3rd dose much better with tolerable nausea. Will continue non-therapeutic dose x 1 more month to ensure tolerability.    Plan:  Continue Zepbound 2.5 mg weekly x 5 weeks     Clinical Pharmacist follow-up: 4 weeks, Telehealth visit    Future Appointments   Date Time Provider Department Center   6/2/2025 11:00 AM Jade Griffin V PharmD [id number] [facility]   8/8/2025  8:30 [facility] CMCAHUMAM U [facility]   8/8/2025  9:30 AM KANDI Mcelroy-CNP [id number] [facility]   10/9/2025  9:10 AM Odalys Palma MD [id number] [facility]       Continue all meds under the continuation of care with the referring provider and clinical pharmacy team.    Thank you,  Jade Griffin  Clinical Pharmacist  [phone]    Verbal consent to manage patient's drug " therapy was obtained from the patient. They were informed they may decline to participate or withdraw from participation in pharmacy services at any time.

## 2025-05-08 RX ORDER — TIRZEPATIDE 2.5 MG/.5ML
2.5 INJECTION, SOLUTION SUBCUTANEOUS
Qty: 2 ML | Refills: 1 | Status: SHIPPED | OUTPATIENT
Start: 2025-05-11 | End: 2025-07-10

## 2025-05-12 ENCOUNTER — APPOINTMENT (OUTPATIENT)
Dept: PHARMACY | Facility: HOSPITAL | Age: 60
End: 2025-05-12
Payer: COMMERCIAL

## 2025-06-02 ENCOUNTER — APPOINTMENT (OUTPATIENT)
Dept: PHARMACY | Facility: HOSPITAL | Age: 60
End: 2025-06-02
Payer: COMMERCIAL

## 2025-06-02 VITALS — WEIGHT: 195.8 LBS | BODY MASS INDEX: 31.6 KG/M2

## 2025-06-02 RX ORDER — TIRZEPATIDE 2.5 MG/.5ML
2.5 INJECTION, SOLUTION SUBCUTANEOUS
Qty: 2 ML | Refills: 5 | Status: SHIPPED | OUTPATIENT
Start: 2025-06-08 | End: 2025-08-07

## 2025-06-02 RX ORDER — TIRZEPATIDE 5 MG/.5ML
5 INJECTION, SOLUTION SUBCUTANEOUS
Qty: 2 ML | Status: CANCELLED | OUTPATIENT
Start: 2025-06-02

## 2025-06-02 NOTE — PROGRESS NOTES
Clinical Pharmacy Appointment    Patient ID: Precious Cruz is a 60 y.o. female who presents for Obesity.    Pt is here for Follow Up appointment.     Referring Provider: Odalys Palma MD  PCP: Odalys Palma MD   Last visit with PCP: 4/8/25   Next visit with PCP: 10/9/25      Subjective     Interval History  Medical [place] insurance   [facility]-  works here.     HPI    OBESITY ASSESSMENT  Current Medications:   Zepbound 2.5 mg weekly (Sunday)    Side effects:   Current: mild nausea  Previous: As of lately, tolerable upset stomach. Side effects improved/resolved with minimizing size of meals, reducing alcohol consumption and increasing water intake. No diarrhea or vomiting after 2nd and 3rd dose. After the first dose: Felt nauseous, worsening acid reflux, belching, flatulence, and one episode of vomiting.     Previous Medications:  None    Pertinent PMH:   Personal/family history of thyroid cancer: No  Personal history of pancreatitis: No  CAD: No  Heart attack: No  Stroke: No  Sleep apnea: No  Hyperlipidemia: No  Hypertension: No  PCOS: No  Fatty liver disease: No  Gallstones: No  Pre-diabetes: No    Weight monitoring:   Starting weight:   Office (4/8/25): 215 lbs (current BMI 34.70) - Obesity (class 1): BMI 30 to <35  Home (4/21/25): 213.7 lbs   6/2/25 weight 195.8 lbs (BMI 31.60) - Obesity (class 1): BMI 30 to <35    Lifestyle:  Diet: Doing Weight Watchers again. Previously lost 40 lbs in 5 months.   Exercise: Weight lifting 3 days per week. Also walks and does yoga.    The ASCVD Risk score ([place] DK, et al., 2019) failed to calculate for the following reasons:    Cannot find a previous HDL lab    Cannot find a previous total cholesterol lab     Drug Interactions  No relevant drug interactions were noted.    Medication System Management  Patient's preferred pharmacy: [facility]  Adherence/Organization: No concerns  Affordability/Accessibility: Paying  "out-of-pocket for Zepbound    Objective   Allergies   Allergen Reactions    Penicillins Unknown     Social History     Social History Narrative    Not on file      Medication Review  Current Outpatient Medications   Medication Instructions    ALPRAZolam (XANAX) 0.25 mg, Daily PRN    b complex 0.4 mg tablet 1 tablet, Daily    BACILLUS COAGULANS-INULIN ORAL Take by mouth.    berberine/herbal complex no.18 (BERBERINE-HERBAL COMB NO.18 ORAL) 1 capsule, Daily    biotin 2,500 mcg capsule 1 capsule, Daily    calcium citrate-vitamin D3 (Citracal+D) 315 mg-5 mcg (200 unit) tablet 1 tablet, Daily    cholecalciferol (VITAMIN D3) 50 mcg, Daily    fluticasone (Flonase) 50 mcg/actuation nasal spray 2 sprays, Each Nostril, Daily, Shake gently. Before first use, prime pump. After use, clean tip and replace cap.    gabapentin (Neurontin) 100 mg capsule As needed    glucosamine sul-chondroitn-msm 500-400-200 mg tablet 1 tablet, Daily    meloxicam (Mobic) 15 mg tablet 1 tablet, As needed    metoprolol tartrate (Lopressor) 50 mg tablet As needed    multivitamin tablet 1 tablet, Daily    NON FORMULARY 1 each, 2 times daily    omeprazole (PRILOSEC) 20 mg, oral, Daily before breakfast, Do not crush or chew.    pseudoephedrine-guaifenesin (Mucinex D)  mg 12 hr tablet 1 tablet, oral, Every 12 hours, Do not crush, chew, or split.    turmeric root extract 500 mg tablet 1 tablet, Daily    vitamin E 1,000 Units, Daily    [START ON 6/8/2025] Zepbound 2.5 mg, abdominal subcutaneous, Once Weekly    zolpidem (AMBIEN) 10 mg, oral, Nightly PRN      Vitals  BP Readings from Last 3 Encounters:   04/08/25 110/78   02/24/25 138/80   01/07/25 120/72     Labs  A1C  No results found for: \"HGBA1C\"  [facility]  Lab Results   Component Value Date    CALCIUM 9.5 04/16/2024     04/16/2024    K 3.5 04/16/2024    CO2 25 04/16/2024     04/16/2024    BUN 15 04/16/2024    CREATININE 0.92 04/16/2024    EGFR 71 04/16/2024     LFTs  Lab Results   Component " "Value Date    ALT 23 04/16/2024    AST 29 04/16/2024    ALKPHOS 49 04/16/2024    BILITOT 0.5 04/16/2024     FLP  Lab Results   Component Value Date    TRIG 144 06/22/2020    CHOL 213 (H) 06/22/2020    LDLF 110 (H) 06/22/2020    HDL 74.2 06/22/2020     Urine Microalbumin  No results found for: \"MICROALBCREA\"  Weight Management  Wt Readings from Last 3 Encounters:   06/02/25 88.8 kg (195 lb 12.8 oz)   04/14/25 90.7 kg (200 lb)   04/08/25 97.5 kg (215 lb)      BMI Readings from Last 3 Encounters:   06/02/25 31.60 kg/m²   04/14/25 32.28 kg/m²   04/08/25 34.70 kg/m²        Assessment/Plan   Problem List Items Addressed This Visit    None  Visit Diagnoses         BMI 35.0-35.9,adult        Relevant Medications    tirzepatide, weight loss, (Zepbound) 2.5 mg/0.5 mL solution (Start on 6/8/2025)    Other Relevant Orders    Referral to Clinical Pharmacy        Current regimen includes Zepbound 2.5 mg/week. Patient's current weight reported as 195.8 lbs. Has lost ~18 lbs (8.4% of TBW; starting weight 213.7 lbs) since starting therapy plan. Patient is actively implementing positive lifestyle modifications (Ex. Reduced-calorie diet, increase physical activity). Agreed on plan to continue Zepbound at current dose due to to the healthy weight loss seen so far, upcoming vacation 6/15-6/22, and residual side effects on low-dose Zepbound.    Plan:  Continue Zepbound 2.5 mg weekly    Clinical Pharmacist follow-up: 4 weeks, Telehealth visit    Future Appointments   Date Time Provider Department Center   6/30/2025 11:00 AM Genesis LoyaD [id number] Academic   8/8/2025  8:30 AM [facility] AHU RASHI 6 CMCAHDANIEL TORRESU Rad   8/8/2025  9:30 AM Jessica Tan APRN-CNP [id number] [facility]   10/9/2025  9:10 AM Odalys Palma MD [id number] [facility]       Continue all meds under the continuation of care with the referring provider and clinical pharmacy team.    Thank you,  Jade Griffin  Clinical Pharmacist  [phone]    Verbal consent to manage " patient's drug therapy was obtained from the patient. They were informed they may decline to participate or withdraw from participation in pharmacy services at any time.

## 2025-06-30 ENCOUNTER — APPOINTMENT (OUTPATIENT)
Dept: PHARMACY | Facility: HOSPITAL | Age: 60
End: 2025-06-30
Payer: COMMERCIAL

## 2025-06-30 VITALS — WEIGHT: 190.2 LBS | BODY MASS INDEX: 30.7 KG/M2

## 2025-06-30 NOTE — PROGRESS NOTES
Clinical Pharmacy Appointment    Patient ID: Precious Cruz is a 60 y.o. female who presents for Obesity.    Pt is here for Follow Up appointment.     Referring Provider: Odalys Palma MD  PCP: Odlays Palma MD   Last visit with PCP: 4/8/25   Next visit with PCP: 10/9/25      Subjective     Interval History  Medical [place] insurance   [facility]-  works here.     HPI    OBESITY ASSESSMENT  Current Medications:   Zepbound 2.5 mg weekly (Sunday)    Side effects:   Current: No more burping, heart burn. Nausea mostly resolved.  Previous: mild nausea    Previous Medications:  None    Pertinent PMH:   Personal/family history of thyroid cancer: No  Personal history of pancreatitis: No  CAD: No  Heart attack: No  Stroke: No  Sleep apnea: No  Hyperlipidemia: No  Hypertension: No  PCOS: No  Fatty liver disease: No  Gallstones: No  Pre-diabetes: No    Weight monitoring:   Starting weight:   Office (4/8/25): 215 lbs (current BMI 34.70) - Obesity (class 1): BMI 30 to <35  Home (4/21/25): 213.7 lbs   6/2/25 weight 195.8 lbs (BMI 31.60) - Obesity (class 1): BMI 30 to <35  6/30/25 weight 190.2 lbs (BMI 30.70) - Obesity (class 1): BMI 30 to <35  Goal weight: 165-170 lbs    Lifestyle:  Diet:   Current: Still doing Weight Watchers.  Previous: Doing Weight Watchers again. Previously lost 40 lbs in 5 months.   Exercise:   Current: Still weight lifting.   Previous: Weight lifting 3 days per week. Also walks and does yoga.    The ASCVD Risk score ([place] DK, et al., 2019) failed to calculate for the following reasons:    Cannot find a previous HDL lab    Cannot find a previous total cholesterol lab     Drug Interactions  No relevant drug interactions were noted.    Medication System Management  Patient's preferred pharmacy: [facility]  Adherence/Organization: No concerns  Affordability/Accessibility: Paying out-of-pocket for Zepbound    Objective   Allergies   Allergen Reactions     "Penicillins Unknown     Social History     Social History Narrative    Not on file      Medication Review  Current Outpatient Medications   Medication Instructions    ALPRAZolam (XANAX) 0.25 mg, Daily PRN    b complex 0.4 mg tablet 1 tablet, Daily    BACILLUS COAGULANS-INULIN ORAL Take by mouth.    berberine/herbal complex no.18 (BERBERINE-HERBAL COMB NO.18 ORAL) 1 capsule, Daily    biotin 2,500 mcg capsule 1 capsule, Daily    calcium citrate-vitamin D3 (Citracal+D) 315 mg-5 mcg (200 unit) tablet 1 tablet, Daily    cholecalciferol (VITAMIN D3) 50 mcg, Daily    fluticasone (Flonase) 50 mcg/actuation nasal spray 2 sprays, Each Nostril, Daily, Shake gently. Before first use, prime pump. After use, clean tip and replace cap.    gabapentin (Neurontin) 100 mg capsule As needed    glucosamine sul-chondroitn-msm 500-400-200 mg tablet 1 tablet, Daily    meloxicam (Mobic) 15 mg tablet 1 tablet, As needed    metoprolol tartrate (Lopressor) 50 mg tablet As needed    multivitamin tablet 1 tablet, Daily    NON FORMULARY 1 each, 2 times daily    omeprazole (PRILOSEC) 20 mg, oral, Daily before breakfast, Do not crush or chew.    pseudoephedrine-guaifenesin (Mucinex D)  mg 12 hr tablet 1 tablet, oral, Every 12 hours, Do not crush, chew, or split.    turmeric root extract 500 mg tablet 1 tablet, Daily    vitamin E 1,000 Units, Daily    Zepbound 2.5 mg, abdominal subcutaneous, Once Weekly    zolpidem (AMBIEN) 10 mg, oral, Nightly PRN      Vitals  BP Readings from Last 3 Encounters:   04/08/25 110/78   02/24/25 138/80   01/07/25 120/72     Labs  A1C  No results found for: \"HGBA1C\"  BMP  Lab Results   Component Value Date    CALCIUM 9.5 04/16/2024     04/16/2024    K 3.5 04/16/2024    CO2 25 04/16/2024     04/16/2024    BUN 15 04/16/2024    CREATININE 0.92 04/16/2024    EGFR 71 04/16/2024     LFTs  Lab Results   Component Value Date    ALT 23 04/16/2024    AST 29 04/16/2024    ALKPHOS 49 04/16/2024    BILITOT 0.5 " "04/16/2024     FLP  Lab Results   Component Value Date    TRIG 144 06/22/2020    CHOL 213 (H) 06/22/2020    LDLF 110 (H) 06/22/2020    HDL 74.2 06/22/2020     Urine Microalbumin  No results found for: \"MICROALBCREA\"  Weight Management  Wt Readings from Last 3 Encounters:   06/30/25 86.3 kg (190 lb 3.2 oz)   06/02/25 88.8 kg (195 lb 12.8 oz)   04/14/25 90.7 kg (200 lb)      BMI Readings from Last 3 Encounters:   06/30/25 30.70 kg/m²   06/02/25 31.60 kg/m²   04/14/25 32.28 kg/m²        Assessment/Plan   Problem List Items Addressed This Visit    None  Visit Diagnoses         BMI 35.0-35.9,adult        Relevant Orders    Referral to Clinical Pharmacy        Current regimen includes Zepbound 2.5 mg/week. Patient's current weight reported as 190.2 lbs. Has lost ~23.5 lbs (11% of TBW; starting weight 213.7 lbs) since starting therapy plan. Patient is actively implementing positive lifestyle modifications (Ex. Reduced-calorie diet, increase physical activity). Since patient is seeing steady weight loss and side effects have almost all resolved, will maintain subtherapeutic dose for another month.    Plan:  Continue Zepbound 2.5 mg weekly    Clinical Pharmacist follow-up: 4 weeks, Telehealth visit    Future Appointments   Date Time Provider Department Center   7/28/2025  9:40 AM Jade SCHUMACHER PharmD [id number] [facility]   8/8/2025  8:[facility] [employer]   8/8/2025  9:30 AM Jessica Tan APRN-CNP [id number] [facility]   10/9/2025  9:10 AM Odalys Palma MD [id number] [facility]       Continue all meds under the continuation of care with the referring provider and clinical pharmacy team.    Thank you,  Jade Griffin  Clinical Pharmacist  [phone]    Verbal consent to manage patient's drug therapy was obtained from the patient. They were informed they may decline to participate or withdraw from participation in pharmacy services at any time.  "

## 2025-07-17 RX ORDER — TIRZEPATIDE 2.5 MG/.5ML
INJECTION, SOLUTION SUBCUTANEOUS
Qty: 2 ML | Refills: 5 | OUTPATIENT
Start: 2025-07-17

## 2025-07-28 ENCOUNTER — APPOINTMENT (OUTPATIENT)
Dept: PHARMACY | Facility: HOSPITAL | Age: 60
End: 2025-07-28
Payer: COMMERCIAL

## 2025-07-28 VITALS — BODY MASS INDEX: 30.05 KG/M2 | WEIGHT: 186.2 LBS

## 2025-07-28 NOTE — PROGRESS NOTES
Clinical Pharmacy Appointment    Patient ID: Precious Cruz is a 60 y.o. female who presents for Obesity.    Pt is here for Follow Up appointment.     Referring Provider: Odalys Palma MD  PCP: Odalys Palma MD   Last visit with PCP: 4/8/25   Next visit with PCP: 10/9/25      Subjective     Interval History  Medical [place] insurance   [facility]-  works here.     HPI    OBESITY ASSESSMENT  Current Medications:   Zepbound 2.5 mg weekly (Sunday)    Side effects:   Previous: No more burping, heart burn. Nausea mostly resolved.  Previous: mild nausea    Previous Medications:  None    Pertinent PMH:   Personal/family history of thyroid cancer: No  Personal history of pancreatitis: No  CAD: No  Heart attack: No  Stroke: No  Sleep apnea: No  Hyperlipidemia: No  Hypertension: No  PCOS: No  Fatty liver disease: No  Gallstones: No  Pre-diabetes: No    Weight monitoring:   Starting weight:   Office (4/8/25): 215 lbs (current BMI 34.70) - Obesity (class 1): BMI 30 to <35  Home (4/21/25): 213.7 lbs   6/2/25 weight 195.8 lbs (BMI 31.60) - Obesity (class 1): BMI 30 to <35  6/30/25 weight 190.2 lbs (BMI 30.70) - Obesity (class 1): BMI 30 to <35  7/28/25 weight 186.2 lbs (BMI 30.05) - Obesity (class 1): BMI 30 to <35  Goal weight: 165-170 lbs    Lifestyle:  Diet:   Current: Still on Weight Watchers. Dietary indiscretions this last weekend.  Previous: Still doing Weight Watchers.  Previous: Doing Weight Watchers again. Previously lost 40 lbs in 5 months.   Exercise:   Previous: Still weight lifting.   Previous: Weight lifting 3 days per week. Also walks and does yoga.    The ASCVD Risk score ([place] DK, et al., 2019) failed to calculate for the following reasons:    Cannot find a previous HDL lab    Cannot find a previous total cholesterol lab     Drug Interactions  No relevant drug interactions were noted.    Medication System Management  Patient's preferred pharmacy: [facility]  "Center  Adherence/Organization: No concerns  Affordability/Accessibility: Paying out-of-pocket for Zepbound    Objective   Allergies   Allergen Reactions    Penicillins Unknown     Social History     Social History Narrative    Not on file      Medication Review  Current Outpatient Medications   Medication Instructions    ALPRAZolam (XANAX) 0.25 mg, Daily PRN    b complex 0.4 mg tablet 1 tablet, Daily    BACILLUS COAGULANS-INULIN ORAL Take by mouth.    berberine/herbal complex no.18 (BERBERINE-HERBAL COMB NO.18 ORAL) 1 capsule, Daily    biotin 2,500 mcg capsule 1 capsule, Daily    calcium citrate-vitamin D3 (Citracal+D) 315 mg-5 mcg (200 unit) tablet 1 tablet, Daily    cholecalciferol (VITAMIN D3) 50 mcg, Daily    fluticasone (Flonase) 50 mcg/actuation nasal spray 2 sprays, Each Nostril, Daily, Shake gently. Before first use, prime pump. After use, clean tip and replace cap.    gabapentin (Neurontin) 100 mg capsule As needed    glucosamine sul-chondroitn-msm 500-400-200 mg tablet 1 tablet, Daily    meloxicam (Mobic) 15 mg tablet 1 tablet, As needed    metoprolol tartrate (Lopressor) 50 mg tablet As needed    multivitamin tablet 1 tablet, Daily    NON FORMULARY 1 each, 2 times daily    omeprazole (PRILOSEC) 20 mg, oral, Daily before breakfast, Do not crush or chew.    pseudoephedrine-guaifenesin (Mucinex D)  mg 12 hr tablet 1 tablet, oral, Every 12 hours, Do not crush, chew, or split.    turmeric root extract 500 mg tablet 1 tablet, Daily    vitamin E 1,000 Units, Daily    Zepbound 2.5 mg, abdominal subcutaneous, Once Weekly    zolpidem (AMBIEN) 10 mg, oral, Nightly PRN      Vitals  BP Readings from Last 3 Encounters:   04/08/25 110/78   02/24/25 138/80   01/07/25 120/72     Labs  A1C  No results found for: \"HGBA1C\"  [facility]  Lab Results   Component Value Date    CALCIUM 9.5 04/16/2024     04/16/2024    K 3.5 04/16/2024    CO2 25 04/16/2024     04/16/2024    BUN 15 04/16/2024    CREATININE 0.92 " "04/16/2024    EGFR 71 04/16/2024     LFTs  Lab Results   Component Value Date    ALT 23 04/16/2024    AST 29 04/16/2024    ALKPHOS 49 04/16/2024    BILITOT 0.5 04/16/2024     FLP  Lab Results   Component Value Date    TRIG 144 06/22/2020    CHOL 213 (H) 06/22/2020    LDLF 110 (H) 06/22/2020    HDL 74.2 06/22/2020     Urine Microalbumin  No results found for: \"MICROALBCREA\"  Weight Management  Wt Readings from Last 3 Encounters:   07/28/25 84.5 kg (186 lb 3.2 oz)   06/30/25 86.3 kg (190 lb 3.2 oz)   06/02/25 88.8 kg (195 lb 12.8 oz)      BMI Readings from Last 3 Encounters:   07/28/25 30.05 kg/m²   06/30/25 30.70 kg/m²   06/02/25 31.60 kg/m²        Assessment/Plan   Problem List Items Addressed This Visit    None  Visit Diagnoses         BMI 35.0-35.9,adult    -  Primary    Relevant Orders    Referral to Clinical Pharmacy        Current regimen includes Zepbound 2.5 mg/week. Patient's current weight reported as 186.2 lbs. Has lost ~27.5 lbs (12.9% of TBW; starting weight 213.7 lbs) since starting therapy plan. Patient is actively implementing positive lifestyle modifications (Ex. Reduced-calorie diet, increase physical activity). Since patient is seeing steady weight loss, will maintain subtherapeutic dose until next check-in.    Plan:  Continue Zepbound 2.5 mg weekly     Clinical Pharmacist follow-up: 6 weeks, Telehealth visit    Future Appointments   Date Time Provider Department Center   8/8/2025  8:[facility]   8/8/2025  9:30 AM KANDI Mcelroy-CNP [id number] [facility]   9/8/2025  9:40 AM Jade Griffin V PharmD [id number] [facility]   10/9/2025  9:10 AM Odalys Palma MD [id number] [facility]       Continue all meds under the continuation of care with the referring provider and clinical pharmacy team.    Thank you,  Jade Griffin  Clinical Pharmacist  [phone]    Verbal consent to manage patient's drug therapy was obtained from the patient. They were informed they may decline to " participate or withdraw from participation in pharmacy services at any time.

## 2025-07-29 ENCOUNTER — PATIENT MESSAGE (OUTPATIENT)
Dept: PHARMACY | Facility: HOSPITAL | Age: 60
End: 2025-07-29
Payer: COMMERCIAL

## 2025-07-30 RX ORDER — TIRZEPATIDE 2.5 MG/.5ML
2.5 INJECTION, SOLUTION SUBCUTANEOUS
Qty: 2 ML | Refills: 2 | Status: SHIPPED | OUTPATIENT
Start: 2025-08-03 | End: 2025-10-02

## 2025-07-31 ENCOUNTER — APPOINTMENT (OUTPATIENT)
Dept: HEMATOLOGY/ONCOLOGY | Facility: CLINIC | Age: 60
End: 2025-07-31
Payer: COMMERCIAL

## 2025-07-31 ENCOUNTER — APPOINTMENT (OUTPATIENT)
Dept: RADIOLOGY | Facility: CLINIC | Age: 60
End: 2025-07-31
Payer: COMMERCIAL

## 2025-08-08 ENCOUNTER — HOSPITAL ENCOUNTER (OUTPATIENT)
Dept: RADIOLOGY | Facility: CLINIC | Age: 60
Discharge: HOME | End: 2025-08-08
Payer: COMMERCIAL

## 2025-08-08 ENCOUNTER — OFFICE VISIT (OUTPATIENT)
Dept: HEMATOLOGY/ONCOLOGY | Facility: CLINIC | Age: 60
End: 2025-08-08
Payer: COMMERCIAL

## 2025-08-08 VITALS
HEART RATE: 53 BPM | RESPIRATION RATE: 16 BRPM | TEMPERATURE: 97 F | DIASTOLIC BLOOD PRESSURE: 72 MMHG | OXYGEN SATURATION: 98 % | SYSTOLIC BLOOD PRESSURE: 122 MMHG

## 2025-08-08 DIAGNOSIS — Z92.21 HISTORY OF AROMATASE INHIBITOR THERAPY: Primary | ICD-10-CM

## 2025-08-08 DIAGNOSIS — Z09 ONCOLOGY FOLLOW-UP ENCOUNTER: ICD-10-CM

## 2025-08-08 DIAGNOSIS — Z85.3 HISTORY OF LEFT BREAST CANCER: ICD-10-CM

## 2025-08-08 DIAGNOSIS — Z92.3 HISTORY OF EXTERNAL BEAM RADIATION THERAPY: ICD-10-CM

## 2025-08-08 PROCEDURE — 77063 BREAST TOMOSYNTHESIS BI: CPT

## 2025-08-08 PROCEDURE — 99215 OFFICE O/P EST HI 40 MIN: CPT | Performed by: NURSE PRACTITIONER

## 2025-08-08 PROCEDURE — 1036F TOBACCO NON-USER: CPT | Performed by: NURSE PRACTITIONER

## 2025-08-08 ASSESSMENT — PATIENT HEALTH QUESTIONNAIRE - PHQ9
2. FEELING DOWN, DEPRESSED OR HOPELESS: NOT AT ALL
SUM OF ALL RESPONSES TO PHQ9 QUESTIONS 1 & 2: 0
1. LITTLE INTEREST OR PLEASURE IN DOING THINGS: NOT AT ALL

## 2025-08-08 ASSESSMENT — PAIN SCALES - GENERAL: PAINLEVEL_OUTOF10: 0-NO PAIN

## 2025-08-08 NOTE — PROGRESS NOTES
Oncology Follow-Up    Precious Cruz  [id number]          Breast         AJCC Edition: 8th (AJCC), Diagnosis Date: August 18,2015, IA, cT1b cN0 cM0 G1      Treatment Synopsis:    Review of OSH records:  7/15/ 2015 - screening mammogram with left breast abnormality in upper lateral quadrant  7/29/15 left breast ultrasound - irregular shaped mass 6 x 7 x 7 mm  Core biopsy on 8/4/2015 was negative for malignancy  Excisional biopsy was pursued on 8/18/15 which showed left breast invasive ductal carcinoma, % positive, [place] 5% positive, HER2 negative, grade 1, tumor size, 0.7cm, no DCIS, 2 mm margin.   Left lumpectomy and sentinel lymph node biopsy on 10/5/15, 0/1 lymph node, [id number], stage IA, Oncotype DX recurrence score of 20 (intermediate risk)      Completed radiation therapy 12/18/2015  Started Anastrozole 12/2015  Stage: [id number], IA     DEXA on 11/4/19 showed osteopenia with T score -1.2 in left femur neck     Anastrozole discontinued July 2022 after 7.5 years of treatment.      Jesús Lang presents for her Oncology Follow Up and mammogram. She feels well and reports no new health issues. Precious is doing monthly Breast self exams. She has a new mole under her right breast. She sees Dr. Palam in primary care. She lost 30# over the last 4 months on Zepbound and Weight [employer]. Precious denies any unusual headaches, balance issues, depression, cough, shortness of breath, problems swallowing, changes in chest/breast area, abdominal pain, bone or muscle pain, vaginal bleeding, rectal bleeding, blood in the urine, vaginal dryness, swelling arms or legs, new or unusual skin moles or lesions. Precious rates her energy level as 10/10 and has increased distress related to work. She is up to date on colon and GYN screenings, and vaccines.      Objective      Vitals:    08/08/25 0850   BP: 122/72   Pulse: 53   Temp: 36.1 °C (97 °F)   SpO2: 98%        Constitutional: Well developed, alert/oriented x3,  no distress, cooperative   Eyes: clear sclera   ENMT: mucous membranes moist, no apparent lesions   Head/Neck: Neck supple, no bruits   Respiratory/Thorax: Patent airways, normal breath sounds with good chest expansion   Cardiovascular: Regular rate and rhythm, no murmurs, 2+ equal pulses of the extremities,   Gastrointestinal: Nondistended, soft, non-tender, no masses palpable, no organomegaly   Musculoskeletal: ROM intact, no joint swelling, normal strength   Extremities: normal extremities, no edema, cyanosis, contusions or wounds   Neurological: alert and oriented x3,  normal strength   Breast:     Lymphatic: No significant lymphadenopathy   Psychological: Appropriate mood and behavior   Skin: Warm and dry, no lesions, no rashes      Physical Exam  Chest:        Comments: Left breast + for breast conserving surgery with well healed UOQ and axillary incisions; no masses, nodules, skin changes, discharge.  Right breast without masses, nodules, skin changes, discharge          Lab Results   Component Value Date    WBC 3.9 (L) 04/16/2024    HGB 12.2 04/16/2024    HCT 35.8 (L) 04/16/2024    MCV 88 04/16/2024     04/16/2024       Chemistry    Lab Results   Component Value Date/Time     04/16/2024 0907    K 3.5 04/16/2024 0907     04/16/2024 0907    CO2 25 04/16/2024 0907    BUN 15 04/16/2024 0907    CREATININE 0.92 04/16/2024 0907    Lab Results   Component Value Date/Time    CALCIUM 9.5 04/16/2024 0907    ALKPHOS 49 04/16/2024 0907    AST 29 04/16/2024 0907    ALT 23 04/16/2024 0907    BILITOT 0.5 04/16/2024 0907         Imaging:  Mammogram results pending at time of this dictation.    1/16/2023 13:49 [phone]     Exam Information    Status Exam Begun Exam Ended   Final 1/16/2023 10:52 1/16/2023 11:02     Study Result    Narrative & Impression   Name: REBA SCHULTZ    Patient ID: [id number] YOB: 1965 Height: 65.0 in.      Gender:     Female   Exam Date:  01/16/2023  Weight: 197.0 lbs.    Indications:      Fractures:        Treatments:          LEFT FEMUR - TOTAL   The bone mineral density : 0.920 g/cm2   T-score : -0.7    % of young normal mean :91 %   Z-score : 0.2    % of age matched mean : 103 %    % change vs. Previous : -     % change vs. Baseline : baseline    *Indicates significant change based on 95% confidence interval.      LEFT FEMUR - NECK   The bone mineral density : 0.850 g/cm2   T-score : -1.4    % of young normal mean: 82 %   Z-score : -0.1    % of age matched mean : 98 %    % change vs. Previous : -     % change vs. Baseline : baseline    *Indicates significant change based on 95% confidence interval.      SPINE L1-L4   The bone mineral density is 1.133 g/cm2   T-score : -0.4   % of young normal mean is 96 %   Z-score : 0.8    % of age matched mean is 109 %    % change vs. Previous : -     % change vs. Baseline : baseline    *Indicates significant change based on 95% confidence interval.       World Health Organization (WHO) criteria for post-menopausal,    Women:     Normal:       T-score at or above -1 SD          Osteopenia:   T-score between -1 and -2.5 SD     Osteoporosis: T-score at or below -2.5 SD           10-Year Fracture Risk:   Major Osteoporotic Fracture 7.3 %    Hip Fracture                0.5 %    Reported Risk Factors       None        Interpretation:   According to World Health Organization criteria, classification is   low bone mass (osteopenia).      Assessment/Plan    Precious is a 61 yo woman with a history of T1bN0 G-1 IDC diagnosed August 2015. She is s/p partial mastectomy, XRT, and completed 7 years of anastrozole in July 2022. There is no evidence of recurrent disease on today's exam.     Plan:  Exam is negative.  Mammogram results pending.  Encouraged monthly breast self exams, plant based diet, keep alcohol <3 drinks/week, exercise at least 2.5 hours/week.  We reviewed signs/symptoms of recurrence including new masses, new  pigmented lesion, tugging or pulling of the skin, nipple discharge, rash in or around the chest area, or any new finding that doesn't resolve within a 2-3 weeks.  All of Precious's questions/concerns were addressed.  Over 30 minutes of time was spent with this patient with >50% of the time with education, counseling, and coordination of care.  I will now see Precious back on an as needed basis. She is aware to call in the future with any concerns.      Diagnoses and all orders for this visit:  History of aromatase inhibitor therapy  History of left breast cancer  -     Clinic Appointment Request Follow Up; CLAY JAMES  -     [facility] mammo bilateral screening tomosynthesis; Future  History of external beam radiation therapy  Oncology follow-up encounter      Clay James, KANDI-CNP

## 2025-08-08 NOTE — PATIENT INSTRUCTIONS
Please call us at [phone] then follow the prompts.    1. Exercise 2.5 hours per week; bone strengthening, cardio-vascular, resistance training.  2. Please do self breast exams monthly.  3. Keep alcohol under 3 drinks per week.  4. Sun safety - limit sun exposure from 11a-2p when its at its hottest, apply 15-30 sun block and re-apply every 1-2 hours if perspiring or swimming.  5. Eat a plant based diet, add in oily fishes such as mackerel, tuna, and salmon.  6. Get in at least 1,000 mg of calcium per day through diet or supplement for bone strength. Examples of foods higher in calcium are milk, yogurt, fruited yogurt, oranges, fortified orange juice, almonds, almond milk, broccoli, spinach, bok pao, mustard greens, puddings, custards, ice cream, fortified cereals, bars, and crackers.   7. Exam was negative today.  8. Please call the office if any new mass or rash in or around breast, or any uncontrolled symptoms that last over 2-3 weeks at [phone].  9. Team will reach out to you for all abnormal results. Mammogram results can take up to 7 days to result in EPIC/Spreetalest. Please call the office if you had additional testing done and need to review the results at [phone].   10. It was nice seeing you today, Precious. I will see you back as needed. PCP will follow up with all breast care related needs.   It has been my privilege to have been your oncology provider. I wish you the very best!   Thank you for choosing [facility] with your care.

## 2025-08-13 ENCOUNTER — TELEPHONE (OUTPATIENT)
Dept: HEMATOLOGY/ONCOLOGY | Facility: CLINIC | Age: 60
End: 2025-08-13
Payer: COMMERCIAL

## 2025-08-13 DIAGNOSIS — R92.8 ABNORMALITY OF RIGHT BREAST ON SCREENING MAMMOGRAM: Primary | ICD-10-CM

## 2025-08-15 ENCOUNTER — HOSPITAL ENCOUNTER (OUTPATIENT)
Dept: RADIOLOGY | Facility: CLINIC | Age: 60
Discharge: HOME | End: 2025-08-15
Payer: COMMERCIAL

## 2025-08-15 DIAGNOSIS — R92.8 ABNORMALITY OF RIGHT BREAST ON SCREENING MAMMOGRAM: ICD-10-CM

## 2025-08-15 PROCEDURE — 77061 BREAST TOMOSYNTHESIS UNI: CPT | Mod: RIGHT SIDE | Performed by: STUDENT IN AN ORGANIZED HEALTH CARE EDUCATION/TRAINING PROGRAM

## 2025-08-15 PROCEDURE — 77065 DX MAMMO INCL CAD UNI: CPT | Mod: RIGHT SIDE | Performed by: STUDENT IN AN ORGANIZED HEALTH CARE EDUCATION/TRAINING PROGRAM

## 2025-08-15 PROCEDURE — 77061 BREAST TOMOSYNTHESIS UNI: CPT | Mod: RT

## 2025-09-08 ENCOUNTER — APPOINTMENT (OUTPATIENT)
Dept: PHARMACY | Facility: HOSPITAL | Age: 60
End: 2025-09-08
Payer: COMMERCIAL

## 2025-10-09 ENCOUNTER — APPOINTMENT (OUTPATIENT)
Dept: PRIMARY CARE | Facility: CLINIC | Age: 60
End: 2025-10-09
Payer: COMMERCIAL

## 2025-11-17 ENCOUNTER — APPOINTMENT (OUTPATIENT)
Dept: PRIMARY CARE | Facility: CLINIC | Age: 60
End: 2025-11-17
Payer: COMMERCIAL